# Patient Record
Sex: MALE | Race: WHITE | NOT HISPANIC OR LATINO
[De-identification: names, ages, dates, MRNs, and addresses within clinical notes are randomized per-mention and may not be internally consistent; named-entity substitution may affect disease eponyms.]

---

## 2017-03-23 PROBLEM — Z00.00 ENCOUNTER FOR PREVENTIVE HEALTH EXAMINATION: Noted: 2017-03-23

## 2017-03-24 ENCOUNTER — APPOINTMENT (OUTPATIENT)
Dept: ORTHOPEDIC SURGERY | Facility: CLINIC | Age: 37
End: 2017-03-24

## 2017-03-24 VITALS
HEART RATE: 54 BPM | WEIGHT: 158 LBS | DIASTOLIC BLOOD PRESSURE: 74 MMHG | BODY MASS INDEX: 23.4 KG/M2 | HEIGHT: 69 IN | SYSTOLIC BLOOD PRESSURE: 112 MMHG

## 2017-03-24 DIAGNOSIS — Z87.891 PERSONAL HISTORY OF NICOTINE DEPENDENCE: ICD-10-CM

## 2017-03-24 DIAGNOSIS — Z78.9 OTHER SPECIFIED HEALTH STATUS: ICD-10-CM

## 2017-03-24 DIAGNOSIS — S53.401A UNSPECIFIED SPRAIN OF RIGHT ELBOW, INITIAL ENCOUNTER: ICD-10-CM

## 2017-03-30 PROBLEM — Z00.00 ENCOUNTER FOR PREVENTIVE HEALTH EXAMINATION: Status: ACTIVE | Noted: 2017-03-30

## 2017-03-31 ENCOUNTER — FORM ENCOUNTER (OUTPATIENT)
Age: 37
End: 2017-03-31

## 2017-04-01 ENCOUNTER — OUTPATIENT (OUTPATIENT)
Dept: OUTPATIENT SERVICES | Facility: HOSPITAL | Age: 37
LOS: 1 days | End: 2017-04-01
Payer: COMMERCIAL

## 2017-04-01 ENCOUNTER — APPOINTMENT (OUTPATIENT)
Dept: MRI IMAGING | Facility: CLINIC | Age: 37
End: 2017-04-01

## 2017-04-01 DIAGNOSIS — S53.401A UNSPECIFIED SPRAIN OF RIGHT ELBOW, INITIAL ENCOUNTER: ICD-10-CM

## 2017-04-01 PROCEDURE — 73221 MRI JOINT UPR EXTREM W/O DYE: CPT

## 2017-04-03 ENCOUNTER — APPOINTMENT (OUTPATIENT)
Dept: MRI IMAGING | Facility: CLINIC | Age: 37
End: 2017-04-03

## 2017-04-05 ENCOUNTER — LABORATORY RESULT (OUTPATIENT)
Age: 37
End: 2017-04-05

## 2017-04-11 ENCOUNTER — APPOINTMENT (OUTPATIENT)
Dept: ORTHOPEDIC SURGERY | Facility: AMBULATORY SURGERY CENTER | Age: 37
End: 2017-04-11
Payer: COMMERCIAL

## 2017-04-11 PROCEDURE — 73080 X-RAY EXAM OF ELBOW: CPT | Mod: 26

## 2017-04-11 PROCEDURE — 76000 FLUOROSCOPY <1 HR PHYS/QHP: CPT | Mod: 26

## 2017-04-11 PROCEDURE — 24342 REPAIR OF RUPTURED TENDON: CPT | Mod: RT

## 2017-04-14 ENCOUNTER — APPOINTMENT (OUTPATIENT)
Dept: ORTHOPEDIC SURGERY | Facility: CLINIC | Age: 37
End: 2017-04-14

## 2017-04-17 ENCOUNTER — APPOINTMENT (OUTPATIENT)
Dept: ORTHOPEDIC SURGERY | Facility: CLINIC | Age: 37
End: 2017-04-17

## 2017-05-02 ENCOUNTER — APPOINTMENT (OUTPATIENT)
Dept: ORTHOPEDIC SURGERY | Facility: CLINIC | Age: 37
End: 2017-05-02

## 2017-05-02 VITALS
HEIGHT: 69 IN | HEART RATE: 68 BPM | BODY MASS INDEX: 23.4 KG/M2 | SYSTOLIC BLOOD PRESSURE: 113 MMHG | WEIGHT: 158 LBS | DIASTOLIC BLOOD PRESSURE: 66 MMHG

## 2017-05-26 ENCOUNTER — APPOINTMENT (OUTPATIENT)
Dept: ORTHOPEDIC SURGERY | Facility: CLINIC | Age: 37
End: 2017-05-26

## 2017-05-26 VITALS
HEART RATE: 55 BPM | HEIGHT: 69 IN | SYSTOLIC BLOOD PRESSURE: 113 MMHG | DIASTOLIC BLOOD PRESSURE: 65 MMHG | BODY MASS INDEX: 23.4 KG/M2 | WEIGHT: 158 LBS

## 2017-07-10 ENCOUNTER — APPOINTMENT (OUTPATIENT)
Dept: ORTHOPEDIC SURGERY | Facility: CLINIC | Age: 37
End: 2017-07-10

## 2017-07-10 VITALS
SYSTOLIC BLOOD PRESSURE: 125 MMHG | HEIGHT: 69 IN | HEART RATE: 53 BPM | BODY MASS INDEX: 23.4 KG/M2 | DIASTOLIC BLOOD PRESSURE: 67 MMHG | WEIGHT: 158 LBS

## 2017-07-13 ENCOUNTER — MESSAGE (OUTPATIENT)
Age: 37
End: 2017-07-13

## 2017-09-08 ENCOUNTER — APPOINTMENT (OUTPATIENT)
Dept: ORTHOPEDIC SURGERY | Facility: CLINIC | Age: 37
End: 2017-09-08
Payer: COMMERCIAL

## 2017-09-29 ENCOUNTER — APPOINTMENT (OUTPATIENT)
Dept: ORTHOPEDIC SURGERY | Facility: CLINIC | Age: 37
End: 2017-09-29
Payer: COMMERCIAL

## 2017-09-29 VITALS
HEART RATE: 55 BPM | DIASTOLIC BLOOD PRESSURE: 79 MMHG | WEIGHT: 158 LBS | BODY MASS INDEX: 23.4 KG/M2 | SYSTOLIC BLOOD PRESSURE: 119 MMHG | HEIGHT: 69 IN

## 2017-09-29 DIAGNOSIS — S46.211D STRAIN OF MUSCLE, FASCIA AND TENDON OF OTHER PARTS OF BICEPS, RIGHT ARM, SUBSEQUENT ENCOUNTER: ICD-10-CM

## 2017-09-29 PROCEDURE — 99213 OFFICE O/P EST LOW 20 MIN: CPT

## 2017-09-29 RX ORDER — CYCLOBENZAPRINE HYDROCHLORIDE 5 MG/1
5 TABLET, FILM COATED ORAL
Qty: 30 | Refills: 0 | Status: DISCONTINUED | COMMUNITY
Start: 2017-04-11 | End: 2017-09-29

## 2017-09-29 RX ORDER — OXYCODONE AND ACETAMINOPHEN 5; 325 MG/1; MG/1
5-325 TABLET ORAL
Qty: 30 | Refills: 0 | Status: DISCONTINUED | COMMUNITY
Start: 2017-04-11 | End: 2017-09-29

## 2017-09-29 RX ORDER — CEPHALEXIN 500 MG/1
500 TABLET ORAL 3 TIMES DAILY
Qty: 15 | Refills: 0 | Status: DISCONTINUED | COMMUNITY
Start: 2017-04-11 | End: 2017-09-29

## 2019-09-06 ENCOUNTER — APPOINTMENT (OUTPATIENT)
Dept: ORTHOPEDIC SURGERY | Facility: CLINIC | Age: 39
End: 2019-09-06
Payer: OTHER MISCELLANEOUS

## 2019-09-06 VITALS
DIASTOLIC BLOOD PRESSURE: 69 MMHG | HEIGHT: 69 IN | WEIGHT: 163 LBS | SYSTOLIC BLOOD PRESSURE: 121 MMHG | BODY MASS INDEX: 24.14 KG/M2 | HEART RATE: 73 BPM

## 2019-09-06 DIAGNOSIS — S56.911A STRAIN OF UNSPECIFIED MUSCLES, FASCIA AND TENDONS AT FOREARM LEVEL, RIGHT ARM, INITIAL ENCOUNTER: ICD-10-CM

## 2019-09-06 PROCEDURE — 99203 OFFICE O/P NEW LOW 30 MIN: CPT

## 2019-09-06 PROCEDURE — 73090 X-RAY EXAM OF FOREARM: CPT

## 2019-09-20 ENCOUNTER — APPOINTMENT (OUTPATIENT)
Dept: ORTHOPEDIC SURGERY | Facility: CLINIC | Age: 39
End: 2019-09-20
Payer: OTHER MISCELLANEOUS

## 2019-09-20 DIAGNOSIS — S56.911D STRAIN OF UNSPECIFIED MUSCLES, FASCIA AND TENDONS AT FOREARM LEVEL, RIGHT ARM, SUBSEQUENT ENCOUNTER: ICD-10-CM

## 2019-09-20 PROCEDURE — 99213 OFFICE O/P EST LOW 20 MIN: CPT

## 2022-09-14 ENCOUNTER — APPOINTMENT (OUTPATIENT)
Dept: INTERNAL MEDICINE | Facility: CLINIC | Age: 42
End: 2022-09-14

## 2022-10-24 ENCOUNTER — TRANSCRIPTION ENCOUNTER (OUTPATIENT)
Age: 42
End: 2022-10-24

## 2022-10-24 NOTE — ASU PATIENT PROFILE, ADULT - FALL HARM RISK - UNIVERSAL INTERVENTIONS
Bed in lowest position, wheels locked, appropriate side rails in place/Call bell, personal items and telephone in reach/Instruct patient to call for assistance before getting out of bed or chair/Non-slip footwear when patient is out of bed/[place] to call system/Physically safe environment - no spills, clutter or unnecessary equipment/Purposeful Proactive Rounding/Room/bathroom lighting operational, light cord in reach

## 2022-10-25 ENCOUNTER — TRANSCRIPTION ENCOUNTER (OUTPATIENT)
Age: 42
End: 2022-10-25

## 2022-10-25 ENCOUNTER — OUTPATIENT (OUTPATIENT)
Dept: OUTPATIENT SERVICES | Facility: HOSPITAL | Age: 42
LOS: 1 days | Discharge: HOME | End: 2022-10-25

## 2022-10-25 ENCOUNTER — RESULT REVIEW (OUTPATIENT)
Age: 42
End: 2022-10-25

## 2022-10-25 VITALS
SYSTOLIC BLOOD PRESSURE: 115 MMHG | TEMPERATURE: 99 F | HEART RATE: 53 BPM | RESPIRATION RATE: 16 BRPM | WEIGHT: 163.14 LBS | OXYGEN SATURATION: 100 % | DIASTOLIC BLOOD PRESSURE: 69 MMHG | HEIGHT: 69 IN

## 2022-10-25 VITALS
SYSTOLIC BLOOD PRESSURE: 119 MMHG | TEMPERATURE: 98 F | HEART RATE: 63 BPM | DIASTOLIC BLOOD PRESSURE: 65 MMHG | OXYGEN SATURATION: 100 % | RESPIRATION RATE: 16 BRPM

## 2022-10-25 DIAGNOSIS — S46.219A STRAIN OF MUSCLE, FASCIA AND TENDON OF OTHER PARTS OF BICEPS, UNSPECIFIED ARM, INITIAL ENCOUNTER: Chronic | ICD-10-CM

## 2022-10-25 LAB — SARS-COV-2 RNA SPEC QL NAA+PROBE: NEGATIVE — SIGNIFICANT CHANGE UP

## 2022-10-25 PROCEDURE — 88305 TISSUE EXAM BY PATHOLOGIST: CPT | Mod: 26

## 2022-10-25 PROCEDURE — 88300 SURGICAL PATH GROSS: CPT | Mod: 26,59

## 2022-10-25 DEVICE — KWIRE 0.9X100 TR/TR: Type: IMPLANTABLE DEVICE | Site: LEFT | Status: FUNCTIONAL

## 2022-10-25 DEVICE — IMPLANTABLE DEVICE: Type: IMPLANTABLE DEVICE | Site: LEFT | Status: FUNCTIONAL

## 2022-10-25 RX ORDER — OXYCODONE AND ACETAMINOPHEN 5; 325 MG/1; MG/1
2 TABLET ORAL EVERY 6 HOURS
Refills: 0 | Status: DISCONTINUED | OUTPATIENT
Start: 2022-10-25 | End: 2022-10-25

## 2022-10-25 RX ORDER — ONDANSETRON 8 MG/1
4 TABLET, FILM COATED ORAL ONCE
Refills: 0 | Status: DISCONTINUED | OUTPATIENT
Start: 2022-10-25 | End: 2022-10-25

## 2022-10-25 RX ORDER — SODIUM CHLORIDE 9 MG/ML
1000 INJECTION, SOLUTION INTRAVENOUS
Refills: 0 | Status: DISCONTINUED | OUTPATIENT
Start: 2022-10-25 | End: 2022-10-25

## 2022-10-25 RX ORDER — OXYCODONE AND ACETAMINOPHEN 5; 325 MG/1; MG/1
1 TABLET ORAL
Qty: 21 | Refills: 0
Start: 2022-10-25

## 2022-10-25 RX ORDER — ACETAMINOPHEN 500 MG
650 TABLET ORAL ONCE
Refills: 0 | Status: DISCONTINUED | OUTPATIENT
Start: 2022-10-25 | End: 2022-10-25

## 2022-10-25 RX ORDER — OXYCODONE AND ACETAMINOPHEN 5; 325 MG/1; MG/1
1 TABLET ORAL EVERY 4 HOURS
Refills: 0 | Status: DISCONTINUED | OUTPATIENT
Start: 2022-10-25 | End: 2022-10-25

## 2022-10-25 NOTE — ASU DISCHARGE PLAN (ADULT/PEDIATRIC) - NS MD DC FALL RISK RISK
For information on Fall & Injury Prevention, visit: [email or web address] OR  [email or web address] OR  https://www.cdc.gov/steadi/patient.html

## 2022-10-25 NOTE — BRIEF OPERATIVE NOTE - NSICDXBRIEFPREOP_GEN_ALL_CORE_FT
PRE-OP DIAGNOSIS:  Other hammer toe(s) (acquired), left foot 25-Oct-2022 16:28:46 left 3rd and 4th digits WalknerCamila, left foot 25-Oct-2022 16:29:14  Camila Nye

## 2022-10-25 NOTE — ASU DISCHARGE PLAN (ADULT/PEDIATRIC) - CARE PROVIDER_API CALL
Jhoana Ca (SANDRA)  Surgery Orthopaedic Surgery  [address] [place] [address], [address]  [place] [address]  Phone: [phone]  Fax: [phone]  Established Patient  Follow Up Time: 1 week

## 2022-10-25 NOTE — BRIEF OPERATIVE NOTE - NSICDXBRIEFPOSTOP_GEN_ALL_CORE_FT
POST-OP DIAGNOSIS:  Bunion, left foot 25-Oct-2022 16:29:18  Walkner, Camila  Other hammer toe(s) (acquired), left foot 25-Oct-2022 16:28:56 left 3rd and 4th digits Walkner, Camila

## 2022-10-25 NOTE — ASU DISCHARGE PLAN (ADULT/PEDIATRIC) - ASU DC SPECIAL INSTRUCTIONSFT
Please keep dressing clean, dry and intact until your post-operative visit with Dr. Ca . Please WBAT in surgical shoe with cane to LLE. Please keep dressing clean, dry and intact until your post-operative visit with Dr. Ca. Please heel touch WBAT in surgical shoe with cane to LLE.

## 2022-10-25 NOTE — BRIEF OPERATIVE NOTE - NSICDXBRIEFPROCEDURE_GEN_ALL_CORE_FT
PROCEDURES:  Mio-Federico bunionectomy of left foot 25-Oct-2022 16:27:30  Camila Nye  Tenotomy, flexor, toe, open 25-Oct-2022 16:27:47 3rd and 4th digits left foot Camila Nye

## 2022-11-02 LAB — SURGICAL PATHOLOGY STUDY: SIGNIFICANT CHANGE UP

## 2023-07-26 ENCOUNTER — INPATIENT (INPATIENT)
Facility: HOSPITAL | Age: 43
LOS: 0 days | Discharge: HOME | End: 2023-07-27
Attending: STUDENT IN AN ORGANIZED HEALTH CARE EDUCATION/TRAINING PROGRAM | Admitting: STUDENT IN AN ORGANIZED HEALTH CARE EDUCATION/TRAINING PROGRAM
Payer: COMMERCIAL

## 2023-07-26 VITALS
TEMPERATURE: 98 F | SYSTOLIC BLOOD PRESSURE: 122 MMHG | OXYGEN SATURATION: 100 % | HEART RATE: 83 BPM | RESPIRATION RATE: 16 BRPM | DIASTOLIC BLOOD PRESSURE: 77 MMHG

## 2023-07-26 DIAGNOSIS — S46.219A STRAIN OF MUSCLE, FASCIA AND TENDON OF OTHER PARTS OF BICEPS, UNSPECIFIED ARM, INITIAL ENCOUNTER: Chronic | ICD-10-CM

## 2023-07-26 DIAGNOSIS — L08.9 LOCAL INFECTION OF THE SKIN AND SUBCUTANEOUS TISSUE, UNSPECIFIED: ICD-10-CM

## 2023-07-26 DIAGNOSIS — Z98.890 OTHER SPECIFIED POSTPROCEDURAL STATES: Chronic | ICD-10-CM

## 2023-07-26 LAB
ALBUMIN SERPL ELPH-MCNC: 4.6 G/DL — SIGNIFICANT CHANGE UP (ref 3.3–5)
ALP SERPL-CCNC: 58 U/L — SIGNIFICANT CHANGE UP (ref 40–120)
ALT FLD-CCNC: 69 U/L — HIGH (ref 4–41)
ANION GAP SERPL CALC-SCNC: 19 MMOL/L — HIGH (ref 7–14)
AST SERPL-CCNC: 100 U/L — HIGH (ref 4–40)
BASOPHILS # BLD AUTO: 0.05 K/UL — SIGNIFICANT CHANGE UP (ref 0–0.2)
BASOPHILS NFR BLD AUTO: 0.8 % — SIGNIFICANT CHANGE UP (ref 0–2)
BILIRUB SERPL-MCNC: 0.5 MG/DL — SIGNIFICANT CHANGE UP (ref 0.2–1.2)
BUN SERPL-MCNC: 16 MG/DL — SIGNIFICANT CHANGE UP (ref 7–23)
CALCIUM SERPL-MCNC: 9.2 MG/DL — SIGNIFICANT CHANGE UP (ref 8.4–10.5)
CHLORIDE SERPL-SCNC: 100 MMOL/L — SIGNIFICANT CHANGE UP (ref 98–107)
CO2 SERPL-SCNC: 22 MMOL/L — SIGNIFICANT CHANGE UP (ref 22–31)
CREAT SERPL-MCNC: 0.64 MG/DL — SIGNIFICANT CHANGE UP (ref 0.5–1.3)
CRP SERPL-MCNC: 4.6 MG/L — SIGNIFICANT CHANGE UP
EGFR: 120 ML/MIN/1.73M2 — SIGNIFICANT CHANGE UP
EOSINOPHIL # BLD AUTO: 0.04 K/UL — SIGNIFICANT CHANGE UP (ref 0–0.5)
EOSINOPHIL NFR BLD AUTO: 0.6 % — SIGNIFICANT CHANGE UP (ref 0–6)
ERYTHROCYTE [SEDIMENTATION RATE] IN BLOOD: 8 MM/HR — SIGNIFICANT CHANGE UP (ref 1–15)
GLUCOSE SERPL-MCNC: 97 MG/DL — SIGNIFICANT CHANGE UP (ref 70–99)
HCT VFR BLD CALC: 35.8 % — LOW (ref 39–50)
HGB BLD-MCNC: 12.2 G/DL — LOW (ref 13–17)
IANC: 2.67 K/UL — SIGNIFICANT CHANGE UP (ref 1.8–7.4)
IMM GRANULOCYTES NFR BLD AUTO: 0.3 % — SIGNIFICANT CHANGE UP (ref 0–0.9)
LYMPHOCYTES # BLD AUTO: 2.83 K/UL — SIGNIFICANT CHANGE UP (ref 1–3.3)
LYMPHOCYTES # BLD AUTO: 44.8 % — HIGH (ref 13–44)
MAGNESIUM SERPL-MCNC: 1.8 MG/DL — SIGNIFICANT CHANGE UP (ref 1.6–2.6)
MCHC RBC-ENTMCNC: 32.8 PG — SIGNIFICANT CHANGE UP (ref 27–34)
MCHC RBC-ENTMCNC: 34.1 GM/DL — SIGNIFICANT CHANGE UP (ref 32–36)
MCV RBC AUTO: 96.2 FL — SIGNIFICANT CHANGE UP (ref 80–100)
MONOCYTES # BLD AUTO: 0.7 K/UL — SIGNIFICANT CHANGE UP (ref 0–0.9)
MONOCYTES NFR BLD AUTO: 11.1 % — SIGNIFICANT CHANGE UP (ref 2–14)
NEUTROPHILS # BLD AUTO: 2.67 K/UL — SIGNIFICANT CHANGE UP (ref 1.8–7.4)
NEUTROPHILS NFR BLD AUTO: 42.4 % — LOW (ref 43–77)
NRBC # BLD: 0 /100 WBCS — SIGNIFICANT CHANGE UP (ref 0–0)
NRBC # FLD: 0 K/UL — SIGNIFICANT CHANGE UP (ref 0–0)
PHOSPHATE SERPL-MCNC: 3.8 MG/DL — SIGNIFICANT CHANGE UP (ref 2.5–4.5)
PLATELET # BLD AUTO: 174 K/UL — SIGNIFICANT CHANGE UP (ref 150–400)
POTASSIUM SERPL-MCNC: 3.7 MMOL/L — SIGNIFICANT CHANGE UP (ref 3.5–5.3)
POTASSIUM SERPL-SCNC: 3.7 MMOL/L — SIGNIFICANT CHANGE UP (ref 3.5–5.3)
PROT SERPL-MCNC: 7.8 G/DL — SIGNIFICANT CHANGE UP (ref 6–8.3)
RBC # BLD: 3.72 M/UL — LOW (ref 4.2–5.8)
RBC # FLD: 13.2 % — SIGNIFICANT CHANGE UP (ref 10.3–14.5)
SODIUM SERPL-SCNC: 141 MMOL/L — SIGNIFICANT CHANGE UP (ref 135–145)
WBC # BLD: 6.31 K/UL — SIGNIFICANT CHANGE UP (ref 3.8–10.5)
WBC # FLD AUTO: 6.31 K/UL — SIGNIFICANT CHANGE UP (ref 3.8–10.5)

## 2023-07-26 PROCEDURE — 99285 EMERGENCY DEPT VISIT HI MDM: CPT

## 2023-07-26 PROCEDURE — 73630 X-RAY EXAM OF FOOT: CPT | Mod: 26,RT

## 2023-07-26 PROCEDURE — 93971 EXTREMITY STUDY: CPT | Mod: 26,LT

## 2023-07-26 PROCEDURE — 99223 1ST HOSP IP/OBS HIGH 75: CPT

## 2023-07-26 RX ORDER — FOLIC ACID 0.8 MG
1 TABLET ORAL DAILY
Refills: 0 | Status: DISCONTINUED | OUTPATIENT
Start: 2023-07-26 | End: 2023-07-27

## 2023-07-26 RX ORDER — ACETAMINOPHEN 500 MG
650 TABLET ORAL EVERY 6 HOURS
Refills: 0 | Status: DISCONTINUED | OUTPATIENT
Start: 2023-07-26 | End: 2023-07-27

## 2023-07-26 RX ORDER — APIXABAN 2.5 MG/1
10 TABLET, FILM COATED ORAL ONCE
Refills: 0 | Status: COMPLETED | OUTPATIENT
Start: 2023-07-26 | End: 2023-07-26

## 2023-07-26 RX ORDER — SODIUM CHLORIDE 9 MG/ML
1000 INJECTION, SOLUTION INTRAVENOUS ONCE
Refills: 0 | Status: COMPLETED | OUTPATIENT
Start: 2023-07-26 | End: 2023-07-26

## 2023-07-26 RX ORDER — SODIUM CHLORIDE 9 MG/ML
1000 INJECTION, SOLUTION INTRAVENOUS
Refills: 0 | Status: DISCONTINUED | OUTPATIENT
Start: 2023-07-26 | End: 2023-07-27

## 2023-07-26 RX ORDER — AMPICILLIN SODIUM AND SULBACTAM SODIUM 250; 125 MG/ML; MG/ML
3 INJECTION, POWDER, FOR SUSPENSION INTRAMUSCULAR; INTRAVENOUS ONCE
Refills: 0 | Status: COMPLETED | OUTPATIENT
Start: 2023-07-26 | End: 2023-07-26

## 2023-07-26 RX ORDER — AMPICILLIN SODIUM AND SULBACTAM SODIUM 250; 125 MG/ML; MG/ML
3 INJECTION, POWDER, FOR SUSPENSION INTRAMUSCULAR; INTRAVENOUS EVERY 6 HOURS
Refills: 0 | Status: DISCONTINUED | OUTPATIENT
Start: 2023-07-27 | End: 2023-07-27

## 2023-07-26 RX ORDER — THIAMINE MONONITRATE (VIT B1) 100 MG
100 TABLET ORAL DAILY
Refills: 0 | Status: DISCONTINUED | OUTPATIENT
Start: 2023-07-26 | End: 2023-07-27

## 2023-07-26 RX ADMIN — AMPICILLIN SODIUM AND SULBACTAM SODIUM 200 GRAM(S): 250; 125 INJECTION, POWDER, FOR SUSPENSION INTRAMUSCULAR; INTRAVENOUS at 21:04

## 2023-07-26 RX ADMIN — Medication 1 MILLIGRAM(S): at 23:51

## 2023-07-26 RX ADMIN — Medication 1 TABLET(S): at 23:50

## 2023-07-26 RX ADMIN — Medication 100 MILLIGRAM(S): at 23:50

## 2023-07-26 RX ADMIN — SODIUM CHLORIDE 1000 MILLILITER(S): 9 INJECTION, SOLUTION INTRAVENOUS at 23:49

## 2023-07-26 RX ADMIN — APIXABAN 10 MILLIGRAM(S): 2.5 TABLET, FILM COATED ORAL at 21:02

## 2023-07-26 RX ADMIN — Medication 50 MILLIGRAM(S): at 21:02

## 2023-07-26 NOTE — ED PROVIDER NOTE - CLINICAL SUMMARY MEDICAL DECISION MAKING FREE TEXT BOX
Wilner Sapp, DO: 42 YOM, no reported PMH, PW right foot swelling.  Patient had bunion surgery on 6/25, after postop check today noted to be swollen erythematous, advised by podiatry to come for evaluation.  Patient denies systemic signs of illness. Patient arrives HDS, well-appearing, neurovascular intact.  Do not suspect necrotizing fasciitis.  Will evaluate for collection versus OM versus cellulitis.  Labs, imaging, reassess.  Podiatry consulted.

## 2023-07-26 NOTE — ED PROVIDER NOTE - CARE PLAN
Principal Discharge DX:	Soft tissue infection   . 1 Principal Discharge DX:	Soft tissue infection  Secondary Diagnosis:	DVT, lower extremity  Secondary Diagnosis:	Alcohol dependence with withdrawal

## 2023-07-26 NOTE — H&P ADULT - NSHPSOCIALHISTORY_GEN_ALL_CORE
SOCIAL HISTORY:    Marital Status:  (  )    (  ) Single        (  )        (  )        (  )   Lives with:          (  ) Alone      (  ) Spouse     (  ) Children         (  ) Parents             (  ) Other    No history of smoking  No history of alcohol abuse  No history of illegal drug use    Occupation: SOCIAL HISTORY:    Marital Status:  (  )    (x  ) Single        (  )        (  )        (  )   Lives with:          ( x ) Alone      (  ) Spouse     (  ) Children         (  ) Parents             (  ) Other    History of smoking; quit in the 1990's after smoking 0.5 to 1ppd x ~ 5 years  Recent history of alcohol overuse - since the right bunionectomy in 06/2023.  Last drink was yesterday  No history of illegal drug use    Occupation:

## 2023-07-26 NOTE — ED ADULT TRIAGE NOTE - CHIEF COMPLAINT QUOTE
Pt sent into ED by doctor for R foot infection s/p bunion removal on 6/25. R foot red and swollen. Denies any fevers, chills, nausea or vomiting. Denies any PHx.

## 2023-07-26 NOTE — H&P ADULT - PROBLEM SELECTOR PLAN 4
- Questionable displacement of the proximal aspect of the base and shaft of the first metatarsal relatie to the head and neck, with mild productive bony changes..., per Podiatry team  - f/u with Podiatry team for further recommendations - reported drinking excessively since R-bunionectomy in 06/2023  - last drink prior to coming to the ED  - reports never having tremors/withdrawal symptoms/signs  - received librium 50 mg PO x one in the ED  - on symptom triggered CIWA  - prescribed thiamine, folic acid, MVI daily  - short course of daily PPI (14 doses)  - IVF hydration of one liter LR, followed by LR at 100 mL/Hr x 10 hours  - f/u AM lab-work  - SBIRT ordered  - Social Work consult in the AM

## 2023-07-26 NOTE — ED PROVIDER NOTE - PHYSICAL EXAMINATION
General: well appearing, interactive, well nourished, no apparent distress, ncat  HEENT: EOMI, PERRLA, normal mucosa, normal oropharynx, no lesions on the lips or on oral mucosa, normal external ear  Neck: supple, no lymphadenopathy, full range of motion, no nuchal rigidity  CV: RRR, normal S1 and S2 with no murmur, capillary refill less than two seconds, dp 2+ b/l     MSK: rle Swollen compared to left, extending to knee.  Noted were swelling at foot, as well as mild erythema at site of surgery.  No crepitus, fluctuance.  Mild erythema noted on foot.  Neuro: CN II-XII grossly intact, muscle strength 5/5 in all extremities, normal gait  Skin: no rashes, skin intact

## 2023-07-26 NOTE — ED ADULT NURSE REASSESSMENT NOTE - NS ED NURSE REASSESS COMMENT FT1
Pt in  zone 11 AxOx4 accompanied by wife at bedside presenting for suspicion of infection s/p operation of R Foot bunion surgery. Was at post op appointment today and surgeon sent him to ED. Pt admitted for infection control Pt in  zone 11 AxOx4 accompanied by wife at bedside presenting for suspicion of infection s/p operation of R Foot bunion surgery. Was at post op appointment today and surgeon sent him to ED. Pt admitted for infection control. DVT found on US. Pt admits to daily drinking, has never been hospitalized for withdrawal. CIWA documented as per flowsheet. Medicated as ordered. L20G patent. Pending transport for bed placement.

## 2023-07-26 NOTE — CONSULT NOTE ADULT - SUBJECTIVE AND OBJECTIVE BOX
Patient is a 42y old  Male who presents with a chief complaint of     HPI:      PAST MEDICAL & SURGICAL HISTORY:  No pertinent past medical history      Biceps tendon tear  right          MEDICATIONS  (STANDING):    MEDICATIONS  (PRN):      Allergies    No Known Allergies    Intolerances        VITALS:    Vital Signs Last 24 Hrs  T(C): 36.6 (26 Jul 2023 15:31), Max: 36.6 (26 Jul 2023 15:31)  T(F): 97.8 (26 Jul 2023 15:31), Max: 97.8 (26 Jul 2023 15:31)  HR: 83 (26 Jul 2023 15:31) (83 - 83)  BP: 122/77 (26 Jul 2023 15:31) (122/77 - 122/77)  BP(mean): --  RR: 16 (26 Jul 2023 15:31) (16 - 16)  SpO2: 100% (26 Jul 2023 15:31) (100% - 100%)    Parameters below as of 26 Jul 2023 15:31  Patient On (Oxygen Delivery Method): room air        LABS:                          12.2   6.31  )-----------( 174      ( 26 Jul 2023 17:30 )             35.8       07-26    141  |  100  |  16  ----------------------------<  97  3.7   |  22  |  0.64    Ca    9.2      26 Jul 2023 17:30    TPro  7.8  /  Alb  4.6  /  TBili  0.5  /  DBili  x   /  AST  100<H>  /  ALT  69<H>  /  AlkPhos  58  07-26      CAPILLARY BLOOD GLUCOSE              LOWER EXTREMITY PHYSICAL EXAM:    Vascular: DP/PT 2/4, B/L, CFT <3 seconds B/L, Temperature gradient warm to cool, B/L.   Neuro: Epicritic sensation intact to the level of digits, B/L.  Musculoskeletal/Ortho: unremarkable  Skin: R lower extremity diffuse pitting +1 edema extending from the toes to the level of mid-calf, Right foot s/p 6/25 bunion surgery, sign of dehiscence and wound to subQ along proximal end of the existing surgical incision over the dorsal aspect of the first MPJ, periwound erythema no probing, no drainage, no purulence, no malodor, no crepitation, medial wound to dermis along the medial distal 1st metatarsal, periwound erythema, no probing, no drainage. L foot no open wounds, no signs of acute infection.         RADIOLOGY & ADDITIONAL STUDIES:  < from: Xray Foot AP + Lateral + Oblique, Right (07.26.23 @ 17:40) >  ******PRELIMINARY REPORT******      ******PRELIMINARY REPORT******         ACC: [id number] EXAM:  XR FOOT COMP MIN 3 VIEWS RT   ORDERED BY: VALENCIA GOLDBERG     PROCEDURE DATE:  07/26/2023    ******PRELIMINARY REPORT******      ******PRELIMINARY REPORT******           INTERPRETATION:  Questionable displacement of the proximal aspect of the   base and shaft of the first metatarsal relatie to the head and neck, with   mild productive bony changes. Correlate for possible malunion fracture.        ******PRELIMINARY REPORT******      ******PRELIMINARY REPORT******       ALEX PARR MD; Resident Radiologist  This document is a PRELIMINARY interpretation and is pending final   attending approval. Jul 26 2023  6:18PM    < end of copied text >  < from: US Duplex Venous Lower Ext Ltd, Right (07.26.23 @ 18:50) >    ACC: [id number] EXAM:  US DPLX LWR EXT VEINS LTD RT   ORDERED BY: VALENCIA GOLDBERG     PROCEDURE DATE:  07/26/2023          INTERPRETATION:  CLINICAL INFORMATION: Right lower extremity swelling.   Status post bunion removal 1 month ago.    COMPARISON: None available.    TECHNIQUE: Duplex sonography of the RIGHT LOWER extremity veins with   color and spectral Doppler, with and without compression.    FINDINGS:    There is normal compressibility of the right common femoral, femoral and   popliteal veins.  The contralateral common femoral vein is patent.  Doppler examination shows normal spontaneous and phasic flow.    The peroneal and soleal veins are incompressible and demonstrate   intraluminal echogenic material.    IMPRESSION:  Acute deep venous thrombosis: below the knee.    Findings discussed with DR. VALENCIA GOLDBERG on 7/26/2023 7:03 PM with read back.        --- End of Report ---          BIANCA NOVA MD; Resident Radiologist  This document has been electronically signed.  LUCÍA JEWELL MD; Attending Radiologist  This document has been electronically signed. Jul 26 2023  7:05PM    < end of copied text >

## 2023-07-26 NOTE — ED PROVIDER NOTE - PROGRESS NOTE DETAILS
Wilner Sapp, DO: Patient reassessed, NAD, non-toxic appearing. results dw pt/family, questions answered. pt feels mildly flushed and diaphoretic, drinks 1 pt daily. case dw Dr. Ca on the phone who agrees with admission. pt agrees. endorsed to hospitalist. no hx seizure.

## 2023-07-26 NOTE — H&P ADULT - PROBLEM SELECTOR PLAN 1
- presented for eval of post surgical right bunionectomy  - US = "The peroneal and soleal veins are incompressible and demonstrate intraluminal echogenic material.  ...Acute deep venous thrombosis: below the knee."  - reportedly more sedentary, due to right bunionectomy  - started on Eliquis 10 mg in the ED; continuing x 3 doses, then switch to maintenance dose, as per Eliquis protocol  - analgesic PRN - presented for eval of post surgical right bunionectomy  - US = "The peroneal and soleal veins are incompressible and demonstrate intraluminal echogenic material.  ...Acute deep venous thrombosis: below the knee."  - reportedly more sedentary, due to right bunionectomy  - started on Eliquis 10 mg in the ED; continuing x 3 doses, then switch to maintenance dose, as per Eliquis protocol  - analgesic PRN  - Vascular consult in the AM (per Podiatry Team)

## 2023-07-26 NOTE — H&P ADULT - PROBLEM SELECTOR PLAN 6
- Hgb = 12.2, MCV = 96.2  - in the context of excessive alcohol consumption  - no report of observed bleeding and none appreciated  - f/u anemia profile in the AM, as well as Vit B12, folate and TSH levels  - f/u hemoglobin trend - Hgb = 12.2, MCV = 96.2  - in the context of excessive alcohol consumption  - no report of observed bleeding and none appreciated  - f/u anemia profile in the AM, as well as Vit B12, folate and TSH levels  - f/u hemoglobin trend  - GI consult (?as outpatient)

## 2023-07-26 NOTE — H&P ADULT - HISTORY OF PRESENT ILLNESS
42 year old male, with past history significant for Alcohol abuse, and Bunionectomy (06/2023), presented to the ED secondary to right foot swelling.  Seen and evaluated at bedside    Vital signs upon ED presentation as follows: BP = 122/77, HR = 83, RR = 16, T = 36.6 C (97.8 F), O2 Sat = 100% on RA.  Diagnosed with Soft Tissue Infection, DVT - Lower Extremity and Alcohol Dependence with Withdrawal and prescribed apixaban 10 mg, Unasyn 3 grams and librium 50 mg in the ED. 42 year old male, with past history significant for Alcohol abuse, and Bunionectomy (06/2023), presented to the ED secondary to right foot swelling.  Seen and evaluated at bedside; NAD.  Patient reports visiting for post-op check of the right bunionectomy site and being sent to the ED for further evaluation.  Has been experiencing some throbbing pain of the right foot and noted swelling of the right leg.  No fever, chills, diaphoresis.  Wears the specialized boot as prescribed.  Patient reports excessive alcohol use since the surgery, but is trying to stop.    Vital signs upon ED presentation as follows: BP = 122/77, HR = 83, RR = 16, T = 36.6 C (97.8 F), O2 Sat = 100% on RA.  Diagnosed with Soft Tissue Infection, DVT - Lower Extremity and Alcohol Dependence with Withdrawal and prescribed apixaban 10 mg, Unasyn 3 grams and librium 50 mg in the ED.

## 2023-07-26 NOTE — H&P ADULT - PROBLEM SELECTOR PLAN 3
- reported drinking excessively since R-bunionectomy in 06/2023  - last drink prior to coming to the ED  - reports never having tremors/withdrawal symptoms/signs  - received librium 50 mg PO x one in the ED  - on symptom triggered CIWA  - prescribed thiamine, folic acid, MVI daily  - short course of daily PPI (14 doses)  - IVF hydration of one liter LR, followed by LR at 100 mL/Hr x 10 hours  - f/u AM lab-work  - SBIRT ordered  - Social Work consult in the AM - Questionable displacement of the proximal aspect of the base and shaft of the first metatarsal relatie to the head and neck, with mild productive bony changes..., per Podiatry team  - f/u with Podiatry team for further recommendations

## 2023-07-26 NOTE — H&P ADULT - NSHPREVIEWOFSYSTEMS_GEN_ALL_CORE
REVIEW OF SYSTEMS:    CONSTITUTIONAL: No weakness, fever, chills or sweating  EYES/ENT: No visual changes.  No dysphagia  NECK: No pain or stiffness  RESPIRATORY: No cough or hemoptysis.  No shortness of breath  CARDIOVASCULAR: No chest pain or palpitations.  No lower extremity edema  GASTROINTESTINAL: No epigastric or abdominal pain. No nausea, vomiting or hematemesis.  No diarrhea or constipation. No melena or hematochezia.  GENITOURINARY: No dysuria, frequency or hematuria  MUSCULOSKELETAL: Throbbing pain of the right foot and swelling of the right leg over the recent days  NEUROLOGICAL: No numbness or weakness  PSYCHIATRY: No anxiety, or depression.  SKIN: No itching, burning, rashes, or lesions   All other review of systems is negative unless indicated above.

## 2023-07-26 NOTE — H&P ADULT - NSHPPHYSICALEXAM_GEN_ALL_CORE
Vital Signs Last 24 Hrs  T(C): 36.7 (26 Jul 2023 20:01), Max: 36.7 (26 Jul 2023 20:01)  T(F): 98.1 (26 Jul 2023 20:01), Max: 98.1 (26 Jul 2023 20:01)  HR: 73 (26 Jul 2023 20:01) (73 - 83)  BP: 124/80 (26 Jul 2023 20:01) (122/77 - 124/80)  BP(mean): --  RR: 17 (26 Jul 2023 20:01) (16 - 17)  SpO2: 100% (26 Jul 2023 20:01) (100% - 100%)    Parameters below as of 26 Jul 2023 20:01  Patient On (Oxygen Delivery Method): room air    ================================================= Vital Signs Last 24 Hrs  T(C): 36.7 (26 Jul 2023 20:01), Max: 36.7 (26 Jul 2023 20:01)  T(F): 98.1 (26 Jul 2023 20:01), Max: 98.1 (26 Jul 2023 20:01)  HR: 73 (26 Jul 2023 20:01) (73 - 83)  BP: 124/80 (26 Jul 2023 20:01) (122/77 - 124/80)  BP(mean): --  RR: 17 (26 Jul 2023 20:01) (16 - 17)  SpO2: 100% (26 Jul 2023 20:01) (100% - 100%)    Parameters below as of 26 Jul 2023 20:01  Patient On (Oxygen Delivery Method): room air    =================================================  PHYSICAL EXAMINATION:    APPEARANCE: Adequately groomed, adequately nourished male, lying propped up in bed in NAD  HEENT: Mildly dry oral mucosa.  Pupils reactive to light.  EOMI  LYMPHATIC: No lymphadenopathy appreciated  CARDIOVASCULAR: (+) S1 S2.  No JVD.  No murmurs.  No edema  RESPIRATORY: No wheezing, rhonchi, crackles appreciated  GASTROINTESTINAL: Soft.  Non-tender.  (+) BS  GENITOURINARY: No suprapubic tenderness.  No CVA tenderness B/L  EXTREMITIES: Normal range of motion.  No clubbing, cyanosis or edema  MUSCULOSKELETAL: Right foot wrapped in clean dressing.  Mild swelling of the right leg  SKIN: Tanned appearance of skin with some flushed appearing areas.  No rashes. No ecchymoses.  No cyanosis  PSYCHIATRIC: A&O x 3.  Mood & affect appropriate to situation  NEUROLOGICAL: Non-focal, GÓMEZ x 4 against gravity  VASCULAR: Radial pulses palpable

## 2023-07-26 NOTE — ED ADULT NURSE NOTE - NSFALLUNIVINTERV_ED_ALL_ED
Bed/Stretcher in lowest position, wheels locked, appropriate side rails in place/Call bell, personal items and telephone in reach/Instruct patient to call for assistance before getting out of bed/chair/stretcher/Non-slip footwear applied when patient is off stretcher/[place] to call system/Physically safe environment - no spills, clutter or unnecessary equipment/Purposeful proactive rounding/Room/bathroom lighting operational, light cord in reach

## 2023-07-26 NOTE — H&P ADULT - TIME-BASED
ANTICOAGULATION  MANAGEMENT - BPA Interacting Medication Review    Interacting medication(s): Sulfamethoxazole-trimethoprim (Bactrim) with warfarin.    Duration: 7 days, 10/23 to 10/30    New medication?:  Yes, interaction per Micromedex, may increase INR and risk of bleeding.    Plan:    Left a detailed message for Stefan regarding potential interaction.     Warfarin instructions: continue current warfarin dose    Follow up INR recommended in:  Mon 10/28/19     Anticoagulation calendar updated    Torin Tobar RN           80

## 2023-07-26 NOTE — H&P ADULT - PROBLEM SELECTOR PLAN 5
- in the setting of alcohol abuse  - lab-work appears hemoconcentrated and BUN/Cr ratio = 25:1  - prescribed one liter LR, followed by one liter over 10 hours  - encourage oral hydration, as tolerated  - f/u electrolytes, renal function, clinical status for improvement

## 2023-07-26 NOTE — CONSULT NOTE ADULT - ASSESSMENT
A 41 yo s/p 6/25 bunion surgery presented with the right foot pain and swelling  - Pt seen and evaluated.  - Afebrile, WBC 6.31, ESR 8, CRP 0.46  - R lower extremity diffuse pitting +1 edema extending from the toes to the level of mid-calf, Right foot s/p 6/25 bunion surgery, sign of dehiscence and wound to subQ along proximal end of the existing surgical incision over the dorsal aspect of the first MPJ, periwound erythema no probing, no drainage, no purulence, no malodor, no crepitation, medial wound to dermis along the medial distal 1st metatarsal, periwound erythema, no probing, no drainage. L foot no open wounds, no signs of acute infection.     - R foot Xray:  Questionable displacement of the proximal aspect of the base and shaft of the first metatarsal relatie to the head and neck, with mild productive bony changes. Correlate for possible malunion fracture, (prelim read).  - R foot venous duplex: Acute DVT.  - R foot wound culture obtained.  - Recommended Augmentin PO for 10 days  - Recommend vascular consult  - Admit for DVT workup  - Weight bearing as tolerated to the heel of right foot in a surgical shoe  - Pt is stable for discharge from pod standpoint, follow up with his podiatrist upon discharge within 1 week.  - Discussed with attending

## 2023-07-26 NOTE — ED PROVIDER NOTE - OBJECTIVE STATEMENT
42 YOM, no reported PMH, PW right foot swelling.  Patient had bunion surgery on 6/25, after postop check today noted to be swollen erythematous, advised by podiatry to come for evaluation.  Patient denies systemic signs of illness.

## 2023-07-26 NOTE — H&P ADULT - PROBLEM SELECTOR PLAN 2
- no fever, chills, diaphoresis; has throbbing pain of the extremity  - CRP = 4.6, ESR = 8; no leukocytosis  - some dehiscence of the post surgical bunionectomy site on the right, per Podiatry team's note  - X-ray notes "Comminuted fracture of the distal shaft of the first metatarsal.  Surrounding soft tissue edema.  Mild chronic degenerative changes in the tarsal bones..."  - wound culture obtained (please f/u)  - s/p Unasyn 3 grams in the ED; continuing Unasyn  - Tylenol PRN mild pain  - if patient becomes febrile, would get blood culture - no fever, chills, diaphoresis; has throbbing pain of the extremity  - CRP = 4.6, ESR = 8; no leukocytosis  - some dehiscence of the post surgical bunionectomy site on the right, per Podiatry team's note  - X-ray notes "Comminuted fracture of the distal shaft of the first metatarsal.  Surrounding soft tissue edema.  Mild chronic degenerative changes in the tarsal bones..."  - wound culture obtained (please f/u)  - s/p Unasyn 3 grams in the ED; continuing Unasyn  - Tylenol PRN mild pain  - if patient becomes febrile, would get blood culture  - would likely benefit from ID consult

## 2023-07-26 NOTE — H&P ADULT - NSHPLABSRESULTS_GEN_ALL_CORE
12.2   6.31  )-----------( 174 ( 26 Jul 2023 17:30 )             35.8     141  |  100  |  16  ----------------------------<  97     07-26  3.7   |  22  |  0.64    Ca    9.2      26 Jul 2023 17:30    TPro  7.8  /  Alb  4.6  /  TBili  0.5  /  DBili  x   /  AST  100<H>  /  ALT  69<H>  /  AlkPhos  58  07-26    =============================================================================        =============================================================================

## 2023-07-26 NOTE — PATIENT PROFILE ADULT - FALL HARM RISK - RISK INTERVENTIONS

## 2023-07-26 NOTE — H&P ADULT - ASSESSMENT
[ x ]  Lab studies personally reviewed  [ x ]  Radiology personally reviewed  [ x ]  Old records personally reviewed    42 year old male, with past history significant for Alcohol abuse, and Bunionectomy (06/2023), presented to the ED secondary to right foot swelling.  Diagnosed with Soft Tissue Infection, DVT - Lower Extremity and Alcohol Dependence with Withdrawal in the ED.

## 2023-07-26 NOTE — H&P ADULT - NSICDXPASTSURGICALHX_GEN_ALL_CORE_FT
PAST SURGICAL HISTORY:  Biceps tendon tear right    History of bunionectomy      PAST SURGICAL HISTORY:  Biceps tendon tear right    History of bunionectomy of left great toe     History of bunionectomy of right great toe

## 2023-07-27 ENCOUNTER — TRANSCRIPTION ENCOUNTER (OUTPATIENT)
Age: 43
End: 2023-07-27

## 2023-07-27 VITALS
SYSTOLIC BLOOD PRESSURE: 118 MMHG | TEMPERATURE: 98 F | RESPIRATION RATE: 18 BRPM | DIASTOLIC BLOOD PRESSURE: 77 MMHG | OXYGEN SATURATION: 99 % | HEART RATE: 70 BPM

## 2023-07-27 DIAGNOSIS — S62.399B: ICD-10-CM

## 2023-07-27 DIAGNOSIS — I82.401 ACUTE EMBOLISM AND THROMBOSIS OF UNSPECIFIED DEEP VEINS OF RIGHT LOWER EXTREMITY: ICD-10-CM

## 2023-07-27 DIAGNOSIS — Z29.9 ENCOUNTER FOR PROPHYLACTIC MEASURES, UNSPECIFIED: ICD-10-CM

## 2023-07-27 DIAGNOSIS — F10.10 ALCOHOL ABUSE, UNCOMPLICATED: ICD-10-CM

## 2023-07-27 DIAGNOSIS — D64.9 ANEMIA, UNSPECIFIED: ICD-10-CM

## 2023-07-27 DIAGNOSIS — E86.0 DEHYDRATION: ICD-10-CM

## 2023-07-27 DIAGNOSIS — Z98.890 OTHER SPECIFIED POSTPROCEDURAL STATES: Chronic | ICD-10-CM

## 2023-07-27 DIAGNOSIS — L08.9 LOCAL INFECTION OF THE SKIN AND SUBCUTANEOUS TISSUE, UNSPECIFIED: ICD-10-CM

## 2023-07-27 LAB
24R-OH-CALCIDIOL SERPL-MCNC: 64.5 NG/ML — SIGNIFICANT CHANGE UP (ref 30–80)
A1C WITH ESTIMATED AVERAGE GLUCOSE RESULT: 4.4 % — SIGNIFICANT CHANGE UP (ref 4–5.6)
ALBUMIN SERPL ELPH-MCNC: 4.3 G/DL — SIGNIFICANT CHANGE UP (ref 3.3–5)
ALP SERPL-CCNC: 56 U/L — SIGNIFICANT CHANGE UP (ref 40–120)
ALT FLD-CCNC: 63 U/L — HIGH (ref 4–41)
ANION GAP SERPL CALC-SCNC: 17 MMOL/L — HIGH (ref 7–14)
APTT BLD: 35.4 SEC — SIGNIFICANT CHANGE UP (ref 24.5–35.6)
AST SERPL-CCNC: 87 U/L — HIGH (ref 4–40)
BASOPHILS # BLD AUTO: 0.04 K/UL — SIGNIFICANT CHANGE UP (ref 0–0.2)
BASOPHILS NFR BLD AUTO: 0.7 % — SIGNIFICANT CHANGE UP (ref 0–2)
BILIRUB SERPL-MCNC: 1.2 MG/DL — SIGNIFICANT CHANGE UP (ref 0.2–1.2)
BUN SERPL-MCNC: 12 MG/DL — SIGNIFICANT CHANGE UP (ref 7–23)
CALCIUM SERPL-MCNC: 9.5 MG/DL — SIGNIFICANT CHANGE UP (ref 8.4–10.5)
CHLORIDE SERPL-SCNC: 100 MMOL/L — SIGNIFICANT CHANGE UP (ref 98–107)
CO2 SERPL-SCNC: 22 MMOL/L — SIGNIFICANT CHANGE UP (ref 22–31)
CREAT SERPL-MCNC: 0.56 MG/DL — SIGNIFICANT CHANGE UP (ref 0.5–1.3)
EGFR: 125 ML/MIN/1.73M2 — SIGNIFICANT CHANGE UP
EOSINOPHIL # BLD AUTO: 0.06 K/UL — SIGNIFICANT CHANGE UP (ref 0–0.5)
EOSINOPHIL NFR BLD AUTO: 1.1 % — SIGNIFICANT CHANGE UP (ref 0–6)
ESTIMATED AVERAGE GLUCOSE: 80 — SIGNIFICANT CHANGE UP
FERRITIN SERPL-MCNC: 601 NG/ML — HIGH (ref 30–400)
FOLATE SERPL-MCNC: >20 NG/ML — HIGH (ref 3.1–17.5)
GGT SERPL-CCNC: 31 U/L — SIGNIFICANT CHANGE UP (ref 8–61)
GLUCOSE SERPL-MCNC: 80 MG/DL — SIGNIFICANT CHANGE UP (ref 70–99)
HCT VFR BLD CALC: 38.3 % — LOW (ref 39–50)
HCYS SERPL-MCNC: 10 UMOL/L — SIGNIFICANT CHANGE UP
HGB BLD-MCNC: 13 G/DL — SIGNIFICANT CHANGE UP (ref 13–17)
IANC: 3.49 K/UL — SIGNIFICANT CHANGE UP (ref 1.8–7.4)
IMM GRANULOCYTES NFR BLD AUTO: 0.4 % — SIGNIFICANT CHANGE UP (ref 0–0.9)
INR BLD: 1.05 RATIO — SIGNIFICANT CHANGE UP (ref 0.85–1.18)
IRON SATN MFR SERPL: 146 UG/DL — SIGNIFICANT CHANGE UP (ref 45–165)
IRON SATN MFR SERPL: 69 % — HIGH (ref 14–50)
LACTATE SERPL-SCNC: 0.7 MMOL/L — SIGNIFICANT CHANGE UP (ref 0.5–2)
LYMPHOCYTES # BLD AUTO: 1.3 K/UL — SIGNIFICANT CHANGE UP (ref 1–3.3)
LYMPHOCYTES # BLD AUTO: 24.2 % — SIGNIFICANT CHANGE UP (ref 13–44)
MAGNESIUM SERPL-MCNC: 1.6 MG/DL — SIGNIFICANT CHANGE UP (ref 1.6–2.6)
MCHC RBC-ENTMCNC: 32.2 PG — SIGNIFICANT CHANGE UP (ref 27–34)
MCHC RBC-ENTMCNC: 33.9 GM/DL — SIGNIFICANT CHANGE UP (ref 32–36)
MCV RBC AUTO: 94.8 FL — SIGNIFICANT CHANGE UP (ref 80–100)
MONOCYTES # BLD AUTO: 0.46 K/UL — SIGNIFICANT CHANGE UP (ref 0–0.9)
MONOCYTES NFR BLD AUTO: 8.6 % — SIGNIFICANT CHANGE UP (ref 2–14)
NEUTROPHILS # BLD AUTO: 3.49 K/UL — SIGNIFICANT CHANGE UP (ref 1.8–7.4)
NEUTROPHILS NFR BLD AUTO: 65 % — SIGNIFICANT CHANGE UP (ref 43–77)
NRBC # BLD: 0 /100 WBCS — SIGNIFICANT CHANGE UP (ref 0–0)
NRBC # FLD: 0 K/UL — SIGNIFICANT CHANGE UP (ref 0–0)
PHOSPHATE SERPL-MCNC: 4.1 MG/DL — SIGNIFICANT CHANGE UP (ref 2.5–4.5)
PLATELET # BLD AUTO: 177 K/UL — SIGNIFICANT CHANGE UP (ref 150–400)
POTASSIUM SERPL-MCNC: 3.9 MMOL/L — SIGNIFICANT CHANGE UP (ref 3.5–5.3)
POTASSIUM SERPL-SCNC: 3.9 MMOL/L — SIGNIFICANT CHANGE UP (ref 3.5–5.3)
PROT SERPL-MCNC: 7.5 G/DL — SIGNIFICANT CHANGE UP (ref 6–8.3)
PROTHROM AB SERPL-ACNC: 11.7 SEC — SIGNIFICANT CHANGE UP (ref 9.5–13)
RBC # BLD: 4.04 M/UL — LOW (ref 4.2–5.8)
RBC # BLD: 4.04 M/UL — LOW (ref 4.2–5.8)
RBC # FLD: 13 % — SIGNIFICANT CHANGE UP (ref 10.3–14.5)
RETICS #: 92.9 K/UL — SIGNIFICANT CHANGE UP (ref 25–125)
RETICS/RBC NFR: 2.3 % — SIGNIFICANT CHANGE UP (ref 0.5–2.5)
SODIUM SERPL-SCNC: 139 MMOL/L — SIGNIFICANT CHANGE UP (ref 135–145)
TIBC SERPL-MCNC: 211 UG/DL — LOW (ref 220–430)
TSH SERPL-MCNC: 4.79 UIU/ML — HIGH (ref 0.27–4.2)
UIBC SERPL-MCNC: 65 UG/DL — LOW (ref 110–370)
VIT B12 SERPL-MCNC: 690 PG/ML — SIGNIFICANT CHANGE UP (ref 200–900)
WBC # BLD: 5.37 K/UL — SIGNIFICANT CHANGE UP (ref 3.8–10.5)
WBC # FLD AUTO: 5.37 K/UL — SIGNIFICANT CHANGE UP (ref 3.8–10.5)

## 2023-07-27 PROCEDURE — 99239 HOSP IP/OBS DSCHRG MGMT >30: CPT

## 2023-07-27 PROCEDURE — 93010 ELECTROCARDIOGRAM REPORT: CPT

## 2023-07-27 RX ORDER — FOLIC ACID 0.8 MG
1 TABLET ORAL
Qty: 30 | Refills: 0
Start: 2023-07-27 | End: 2023-08-25

## 2023-07-27 RX ORDER — ACETAMINOPHEN 500 MG
2 TABLET ORAL
Qty: 0 | Refills: 0 | DISCHARGE
Start: 2023-07-27

## 2023-07-27 RX ORDER — APIXABAN 2.5 MG/1
1 TABLET, FILM COATED ORAL
Qty: 60 | Refills: 0
Start: 2023-07-27 | End: 2023-08-25

## 2023-07-27 RX ORDER — APIXABAN 2.5 MG/1
10 TABLET, FILM COATED ORAL EVERY 12 HOURS
Refills: 0 | Status: DISCONTINUED | OUTPATIENT
Start: 2023-07-27 | End: 2023-07-27

## 2023-07-27 RX ORDER — IBUPROFEN 200 MG
600 TABLET ORAL ONCE
Refills: 0 | Status: COMPLETED | OUTPATIENT
Start: 2023-07-27 | End: 2023-07-27

## 2023-07-27 RX ORDER — APIXABAN 2.5 MG/1
2 TABLET, FILM COATED ORAL
Qty: 120 | Refills: 0
Start: 2023-07-27 | End: 2023-08-25

## 2023-07-27 RX ORDER — APIXABAN 2.5 MG/1
5 TABLET, FILM COATED ORAL EVERY 12 HOURS
Refills: 0 | Status: DISCONTINUED | OUTPATIENT
Start: 2023-07-27 | End: 2023-07-27

## 2023-07-27 RX ORDER — PANTOPRAZOLE SODIUM 20 MG/1
40 TABLET, DELAYED RELEASE ORAL
Refills: 0 | Status: DISCONTINUED | OUTPATIENT
Start: 2023-07-27 | End: 2023-07-27

## 2023-07-27 RX ORDER — PANTOPRAZOLE SODIUM 20 MG/1
1 TABLET, DELAYED RELEASE ORAL
Qty: 30 | Refills: 0
Start: 2023-07-27 | End: 2023-08-25

## 2023-07-27 RX ADMIN — SODIUM CHLORIDE 125 MILLILITER(S): 9 INJECTION, SOLUTION INTRAVENOUS at 00:50

## 2023-07-27 RX ADMIN — Medication 600 MILLIGRAM(S): at 07:58

## 2023-07-27 RX ADMIN — Medication 1 MILLIGRAM(S): at 12:21

## 2023-07-27 RX ADMIN — Medication 100 MILLIGRAM(S): at 12:21

## 2023-07-27 RX ADMIN — Medication 600 MILLIGRAM(S): at 08:30

## 2023-07-27 RX ADMIN — AMPICILLIN SODIUM AND SULBACTAM SODIUM 200 GRAM(S): 250; 125 INJECTION, POWDER, FOR SUSPENSION INTRAMUSCULAR; INTRAVENOUS at 09:07

## 2023-07-27 RX ADMIN — Medication 1 TABLET(S): at 12:20

## 2023-07-27 RX ADMIN — AMPICILLIN SODIUM AND SULBACTAM SODIUM 200 GRAM(S): 250; 125 INJECTION, POWDER, FOR SUSPENSION INTRAMUSCULAR; INTRAVENOUS at 03:21

## 2023-07-27 RX ADMIN — APIXABAN 10 MILLIGRAM(S): 2.5 TABLET, FILM COATED ORAL at 09:08

## 2023-07-27 RX ADMIN — AMPICILLIN SODIUM AND SULBACTAM SODIUM 200 GRAM(S): 250; 125 INJECTION, POWDER, FOR SUSPENSION INTRAMUSCULAR; INTRAVENOUS at 15:03

## 2023-07-27 NOTE — DISCHARGE NOTE PROVIDER - CARE PROVIDER_API CALL
Your, Primary doctor  Phone: (   )    -  Fax: (   )    -  Follow Up Time: 1 week    The, Podiatrist  Phone: (   )    -  Fax: (   )    -  Follow Up Time: 1 week

## 2023-07-27 NOTE — PHYSICAL THERAPY INITIAL EVALUATION ADULT - ACTIVE RANGE OF MOTION EXAMINATION, REHAB EVAL
except right ankle df/pf 0-5 degrees/bilateral upper extremity Active ROM was WFL (within functional limits)/bilateral  lower extremity Active ROM was WFL (within functional limits)

## 2023-07-27 NOTE — DISCHARGE NOTE PROVIDER - NSDCCPTREATMENT_GEN_ALL_CORE_FT
PRINCIPAL PROCEDURE  Procedure: Ultrasound of right lower extremity for deep vein thrombosis (DVT)  Findings and Treatment: FINDINGS:  There is normal compressibility of the right common femoral, femoral and   popliteal veins.  The contralateral common femoral vein is patent.  Doppler examination shows normal spontaneous and phasic flow.  The peroneal and soleal veins are incompressible and demonstrate   intraluminal echogenic material.  IMPRESSION:  Acute deep venous thrombosis: below the knee.

## 2023-07-27 NOTE — DISCHARGE NOTE PROVIDER - HOSPITAL COURSE
42M with alcohol use disorder and bunionectomy 6/2023 presents with right foot swelling. Pt seen by podiatry and found to have wound dehisence of bunionectomy. Recommend weight bearing as tolerated in surgical shoe along with augmentin for ten days. LE doppler was positive for DVT and he was started on eliquis for anticoagulation. Pt was instructed to follow up with podiatry and his primary care doctor within 2 weeks of discharge.     All questions answered. Pt stable for d/c home.     To Do:  [ ] start eliquis as directed- 10mg BID for 7 days, followed by 5mg BID   [ ] complete augmentin for 10 days  [ ] follow up with PMD in 1-2 weeks  [ ] follow up with podiatry in 1-2 weeks

## 2023-07-27 NOTE — DISCHARGE NOTE PROVIDER - PROVIDER TOKENS
FREE:[LAST:[Your],FIRST:[Primary doctor],PHONE:[(   )    -],FAX:[(   )    -],FOLLOWUP:[1 week]],FREE:[LAST:[The],FIRST:[Podiatrist],PHONE:[(   )    -],FAX:[(   )    -],FOLLOWUP:[1 week]]

## 2023-07-27 NOTE — DISCHARGE NOTE NURSING/CASE MANAGEMENT/SOCIAL WORK - NSDCPEFALRISK_GEN_ALL_CORE
For information on Fall & Injury Prevention, visit: [email or web address] OR  [email or web address] OR  https://www.cdc.gov/steadi/patient.html

## 2023-07-27 NOTE — DISCHARGE NOTE PROVIDER - NSDCFUADDAPPT_GEN_ALL_CORE_FT
================================  Podiatry Discharge Instructions:  - Follow up: Please follow up with Dr. Nick Ca within 1 week of discharge from the hospital, please call [phone] or [phone] for appointment and discuss that you recently were seen in the hospital.  - Wound Care: please apply Mupirocin to R foot wound followed by 4x4 Gauze, and Kerlex. DAILY   - Weight bearing: Please weight bearing as tolerated to heel in a surgical shoe to R foot   - Antibiotics: augmentin 875 X 10 days  ================================

## 2023-07-27 NOTE — DISCHARGE NOTE PROVIDER - ATTENDING DISCHARGE PHYSICAL EXAMINATION:
T(C): 36.6 (07-27-23 @ 06:25), Max: 36.7 (07-26-23 @ 20:01)  HR: 64 (07-27-23 @ 06:25) (64 - 83)  BP: 110/80 (07-27-23 @ 06:25) (110/80 - 132/89)  RR: 18 (07-27-23 @ 06:25) (16 - 18)  SpO2: 99% (07-27-23 @ 06:25) (99% - 100%)    CONSTITUTIONAL: Well groomed, no apparent distress  EYES: PERRLA and symmetric, EOMI, No conjunctival or scleral injection, non-icteric  ENMT: Oral mucosa with moist membranes. Normal dentition; no pharyngeal injection or exudates             NECK: Supple, symmetric and without tracheal deviation   RESP: No respiratory distress, no use of accessory muscles; CTA b/l, no WRR  CV: RRR, +S1S2, no MRG; no JVD; no peripheral edema  GI: Soft, NT, ND, no rebound, no guarding; no palpable masses; no hepatosplenomegaly; no hernia palpated  LYMPH: No cervical LAD or tenderness; no axillary LAD or tenderness; no inguinal LAD or tenderness  MSK: Normal gait; No digital clubbing or cyanosis; examination of the (head/neck/spine/ribs/pelvis, RUE, LUE, RLE, LLE) without misalignment,            Normal ROM without pain, no spinal tenderness, normal muscle strength/tone  SKIN: No rashes or ulcers noted; no subcutaneous nodules or induration palpable; RLE surgical site dressing C/D/I  NEURO: CN II-XII intact; normal reflexes in upper and lower extremities, sensation intact in upper and lower extremities b/l to light touch   PSYCH: Appropriate insight/judgment; A+O x 3, mood and affect appropriate, recent/remote memory intact T(C): 36.6 (07-27-23 @ 06:25), Max: 36.7 (07-26-23 @ 20:01)  HR: 64 (07-27-23 @ 06:25) (64 - 83)  BP: 110/80 (07-27-23 @ 06:25) (110/80 - 132/89)  RR: 18 (07-27-23 @ 06:25) (16 - 18)  SpO2: 99% (07-27-23 @ 06:25) (99% - 100%)    CONSTITUTIONAL: Well groomed, no apparent distress  EYES: PERRLA and symmetric, EOMI, No conjunctival or scleral injection, non-icteric  ENMT: Oral mucosa with moist membranes. Normal dentition; no pharyngeal injection or exudates             NECK: Supple, symmetric and without tracheal deviation   RESP: No respiratory distress, no use of accessory muscles; CTA b/l, no WRR  CV: RRR, +S1S2, no MRG; no JVD; no peripheral edema  GI: Soft, NT, ND, no rebound, no guarding; no palpable masses; no hepatosplenomegaly; no hernia palpated  LYMPH: No cervical LAD or tenderness; no axillary LAD or tenderness; no inguinal LAD or tenderness  MSK: Normal gait; No digital clubbing or cyanosis; examination of the (head/neck/spine/ribs/pelvis, RUE, LUE, RLE, LLE) without misalignment,            Normal ROM without pain, no spinal tenderness, normal muscle strength/tone  SKIN: No rashes or ulcers noted; no subcutaneous nodules or induration palpable; RLE surgical site dressing C/D/I  NEURO: CN II-XII intact; normal reflexes in upper and lower extremities, sensation intact in upper and lower extremities b/l to light touch; no tremors or tongue fasciculations  PSYCH: Appropriate insight/judgment; A+O x 3, mood and affect appropriate, recent/remote memory intact

## 2023-07-27 NOTE — PHYSICAL THERAPY INITIAL EVALUATION ADULT - PERTINENT HX OF CURRENT PROBLEM, REHAB EVAL
42 year old male, with past history significant for Alcohol abuse, and Bunionectomy (06/2023), presented to the ED secondary to right foot swelling.

## 2023-07-27 NOTE — PROGRESS NOTE ADULT - ASSESSMENT
A 41 yo s/p 6/25 bunion surgery presented with the right foot pain and swelling  - Pt seen and evaluated.  - Afebrile, WBC 5.37, ESR 8, CRP 0.46  - R lower extremity diffuse pitting +1 edema extending from the toes to the level of mid-calf, Right foot s/p 6/25 bunion surgery,  incision mild dehiscence to proximal aspect to subQ, periwound erythema, no drainage, no purulence, no malodor, no crepitation, medial wound to dermis along the medial distal 1st metatarsal, periwound erythema, no probing, no drainage. L foot no open wounds, no signs of acute infection.     - R foot Xray:  Comminuted fracture of the distal shaft of the first metatarsal  - R foot venous duplex: Acute DVT.  - R foot wound culture Pending.  - Recommended Augmentin PO for 10 days  - Vasc eval pending   - Weight bearing as tolerated to the heel of right foot in a surgical shoe  - Pt is stable for discharge from pod standpoint pending Vasc recs, follow up with his podiatrist upon discharge within 1 week.  - Discussed with attending

## 2023-07-27 NOTE — PHYSICAL THERAPY INITIAL EVALUATION ADULT - HEALTH SCREEN CRITERIA
"Chief Complaint  Establish Care, COPD, Chronic Kidney Disease, and Exposure To Known Illness (no symptoms, had both vaccines and booster)    Subjective          Latanya Olsen presents to [facility]  Here to establish.  Had been seeing Dr. Perkins and that was not working out.  Sees Dr. Hunter for chronic kidney disease.  Has been concerned about her CKD and ? DM.  Urine has had an odor for over a month.  Had some black stool about 2 weeks ago.  Does not know when her last colonoscopy was.  She has been on Eliquis since the spring for DVT.  She has seen hematology.  She has been concerned about some odor in her urine.  This is been going on for a month.  She has had a couple hospitalizations and nursing home stays this year her first DVT was so extensive they had to do a thrombectomy.  She had a new thrombosis while on Coumadin with an INR of 2.1.    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  Was exposed top someone Saturday with Covid.  She has not had any symptoms and has had 3 shots.          Objective   Vital Signs:   /60   Pulse 97   Temp 98.4 °F (36.9 °C)   Ht 167.6 cm (66\")   Wt 94.8 kg (209 lb)   SpO2 95%   BMI 33.73 kg/m²     Physical Exam  Constitutional:       General: She is not in acute distress.     Appearance: Normal appearance. She is well-developed. She is obese.   HENT:      Head: Normocephalic and atraumatic.      Right Ear: External ear normal.      Left Ear: External ear normal.      Mouth/Throat:      Mouth: Mucous membranes are moist.      Pharynx: Oropharynx is clear.   Eyes:      Conjunctiva/sclera: Conjunctivae normal.      Pupils: Pupils are equal, round, and reactive to light.   Neck:      Thyroid: No thyromegaly.   Cardiovascular:      Rate and Rhythm: Normal rate and regular rhythm.      Heart sounds: No murmur heard.     Pulmonary:      Effort: Pulmonary effort is normal.      Breath sounds: Normal breath sounds. No " wheezing.   Abdominal:      General: Bowel sounds are normal.      Palpations: Abdomen is soft.      Tenderness: There is no abdominal tenderness.   Musculoskeletal:         General: Normal range of motion.      Cervical back: Neck supple.      Right lower leg: Edema present.      Left lower leg: Edema present.      Comments: Support hose in place   Lymphadenopathy:      Cervical: No cervical adenopathy.   Skin:     General: Skin is warm and dry.   Neurological:      Mental Status: She is alert and oriented to person, place, and time.   Psychiatric:         Mood and Affect: Mood normal.         Behavior: Behavior normal.        Result Review :   The following data was reviewed by: Meredith Lea Kehrer, MD on 09/20/2021:  UA    Urinalysis 3/6/21 3/6/21 3/6/21 9/20/21    [id number]    Squamous Epithelial Cells, UA   0-2    Specific [place], UA 1.015 1.015     Ketones, UA Negative Negative  Negative   Blood, UA Negative Negative     Leukocytes, UA Negative Trace (A)  Negative   Nitrite, UA Negative Negative     RBC, UA   0-2    WBC, UA   0-2    Bacteria, UA   None Seen    (A) Abnormal value                 Office Visit with Delfino Scruggs MD (05/14/2021)  Homocysteine, plasma (05/14/2021 15:16)  Comprehensive Metabolic Panel (05/14/2021 15:16)  CBC & Differential (05/14/2021 15:16)       Assessment and Plan    Diagnoses and all orders for this visit:    1. Exposure to COVID-19 virus (Primary)  -     COVID-19,[facility] ROUTINE, NP/OP SWAB IN TRANSPORT MEDIA OR ESWAB 72 HR TAT - Swab, Oropharynx    2. Urinary body odor  -     POC Urinalysis Dipstick, Automated  -     Urine Culture - Urine, Urine, Clean Catch    3. Abnormal glucose  -     Hemoglobin A1c    4. Iron deficiency anemia, unspecified iron deficiency anemia type  -     CBC & Differential  -     Iron  -     Ferritin    5. Stage 3a chronic kidney disease (CMS/HCC)  -     Basic Metabolic Panel    6. Age-related osteoporosis without current pathological  fracture    7. Edema, unspecified type    8. Proteinuria, unspecified type  -     Protein, Urine, 24 Hour - Urine, Clean Catch; Future  -     Urine Culture - Urine, Urine, Clean Catch    Distant exposure to Covid-we will get tested today  Urinary body odor-we will check a culture  Abnormal glucose check A1c  Iron deficiency anemia-recheck labs today  Stage III CKD-check BMP  Osteoporosis-we will await old records to make decision  Edema-review old records  Proteinuria-check urine culture and 24 urine  We will do more for the patient when we get her records together for her Medicare wellness exam    Follow Up   Return in about 6 weeks (around 11/1/2021) for Medicare Wellness.  Patient was given instructions and counseling regarding her condition or for health maintenance advice. Please see specific information pulled into the AVS if appropriate.        yes

## 2023-07-27 NOTE — DISCHARGE NOTE NURSING/CASE MANAGEMENT/SOCIAL WORK - NSDCFUADDAPPT_GEN_ALL_CORE_FT
================================  Podiatry Discharge Instructions:  - Follow up: Please follow up with Dr. Nick Ca within 1 week of discharge from the hospital, please call [phone] or [phone] for appointment and discuss that you recently were seen in the hospital.  - Wound Care: please apply Mupirocin to R foot wound followed by 4x4 Gauze, and Kerlex. DAILY   - Weight bearing: Please weight bearing as tolerated to heel in a surgical shoe to R foot   - Antibiotics: augmentin 875 X 10 days  ================================

## 2023-07-27 NOTE — DISCHARGE NOTE PROVIDER - NSDCMRMEDTOKEN_GEN_ALL_CORE_FT
vitamin B complex:   vitamin D:    acetaminophen 325 mg oral tablet: 2 tab(s) orally every 6 hours As needed Mild Pain (1 - 3)  apixaban 5 mg oral tablet: 2 tab(s) orally every 12 hours Please take 10mg twice a day for 7 days, followed by 5mg twice a day after that  folic acid 1 mg oral tablet: 1 tab(s) orally once a day  Multiple Vitamins oral tablet: 1 tab(s) orally once a day  pantoprazole 40 mg oral delayed release tablet: 1 tab(s) orally once a day (before a meal)  vitamin B complex:   vitamin D:    acetaminophen 325 mg oral tablet: 2 tab(s) orally every 6 hours As needed Mild Pain (1 - 3)  amoxicillin-clavulanate 875 mg-125 mg oral tablet: 1 tab(s) orally every 12 hours  apixaban 5 mg oral tablet: 2 tab(s) orally every 12 hours Please take 10mg twice a day for 7 days, followed by 5mg twice a day after that  folic acid 1 mg oral tablet: 1 tab(s) orally once a day  Multiple Vitamins oral tablet: 1 tab(s) orally once a day  pantoprazole 40 mg oral delayed release tablet: 1 tab(s) orally once a day (before a meal)  vitamin B complex:   vitamin D:    acetaminophen 325 mg oral tablet: 2 tab(s) orally every 6 hours As needed Mild Pain (1 - 3)  amoxicillin-clavulanate 875 mg-125 mg oral tablet: 1 tab(s) orally every 12 hours  Eliquis Starter Pack for Treatment of DVT and PE 5 mg oral tablet: 1 tab(s) orally 2 times a day Please take 10mg twice a day for a total of 7 days (6 more days), followed by 5mg twice a day after that  folic acid 1 mg oral tablet: 1 tab(s) orally once a day  Multiple Vitamins oral tablet: 1 tab(s) orally once a day  pantoprazole 40 mg oral delayed release tablet: 1 tab(s) orally once a day (before a meal)  vitamin B complex:   vitamin D:

## 2023-07-27 NOTE — PROGRESS NOTE ADULT - SUBJECTIVE AND OBJECTIVE BOX
MARIAM WONG, MRN [id number], 42ymale      Patient is a 42y old  Male who presents with a chief complaint of Soft tissue infection, DVT of lower extremity (right), Alcohol dependence with withdrawal (26 Jul 2023 21:25)       INTERVAL HPI/OVERNIGHT EVENTS:  Patient seen and evaluated at bedside.  Pt is resting comfortable in NAD. Denies N/V/F/C.    Allergies    No Known Allergies    Intolerances        Vital Signs Last 24 Hrs  T(C): 36.6 (27 Jul 2023 06:25), Max: 36.7 (26 Jul 2023 20:01)  T(F): 97.9 (27 Jul 2023 06:25), Max: 98.1 (26 Jul 2023 20:01)  HR: 64 (27 Jul 2023 06:25) (64 - 83)  BP: 110/80 (27 Jul 2023 06:25) (110/80 - 132/89)  BP(mean): --  RR: 18 (27 Jul 2023 06:25) (16 - 18)  SpO2: 99% (27 Jul 2023 06:25) (99% - 100%)    Parameters below as of 27 Jul 2023 06:25  Patient On (Oxygen Delivery Method): room air        LABS:                        13.0   5.37  )-----------( 177      ( 27 Jul 2023 06:00 )             38.3     07-27    139  |  100  |  12  ----------------------------<  80  3.9   |  22  |  0.56    Ca    9.5      27 Jul 2023 06:00  Phos  4.1     07-27  Mg     1.60     07-27    TPro  7.5  /  Alb  4.3  /  TBili  1.2  /  DBili  x   /  AST  87<H>  /  ALT  63<H>  /  AlkPhos  56  07-27    PT/INR - ( 27 Jul 2023 06:00 )   PT: 11.7 sec;   INR: 1.05 ratio         PTT - ( 27 Jul 2023 06:00 )  PTT:35.4 sec  Urinalysis Basic - ( 27 Jul 2023 06:00 )    Color: x / Appearance: x / SG: x / pH: x  Gluc: 80 mg/dL / Ketone: x  / Bili: x / Urobili: x   Blood: x / Protein: x / Nitrite: x   Leuk Esterase: x / RBC: x / WBC x   Sq Epi: x / Non Sq Epi: x / Bacteria: x      CAPILLARY BLOOD GLUCOSE          Lower Extremity Physical Exam:    Vascular: DP/PT 2/4, B/L, CFT <3 seconds B/L, Temperature gradient warm to cool, B/L.   Neuro: Epicritic sensation intact to the level of digits, B/L.  Musculoskeletal/Ortho: unremarkable  Skin: R lower extremity diffuse pitting +1 edema extending from the toes to the level of mid-calf, Right foot s/p 6/25 bunion surgery,  incision mild dehiscence to proximal aspect to subQ, periwound erythema, no drainage, no purulence, no malodor, no crepitation, medial wound to dermis along the medial distal 1st metatarsal, periwound erythema, no probing, no drainage. L foot no open wounds, no signs of acute infection.         RADIOLOGY & ADDITIONAL TESTS:    ACC: [id number] EXAM:  XR FOOT COMP MIN 3 VIEWS RT   ORDERED BY: VALENCIA GOLDBERG     PROCEDURE DATE:  07/26/2023          INTERPRETATION:  CLINICAL INDICATION: Foot pain and swelling status post   lung surgery on June 25, 2023.    TECHNIQUE:  3 views of the right foot..    COMPARISON: X-ray of the left foot on August 5, 2009.    FINDINGS:  Comminuted fracture of the distal shaft of the first metatarsal.   Surrounding soft tissue edema.  Mild chronic degenerative changes in the tarsal bones.    IMPRESSION:  Comminuted fracture of the distal shaft of the first metatarsal.   Surrounding    --- End of Report ---          CHATO MASCORRO DO; Resident Radiologist  This document has been electronically signed.  RACHANA KINGSTON DO; Attending Radiologist  This document has been electronically signed. Jul 26 2023  8:40PM

## 2023-07-27 NOTE — DISCHARGE NOTE NURSING/CASE MANAGEMENT/SOCIAL WORK - PATIENT PORTAL LINK FT
You can access the FollowMyHealth Patient Portal offered by [facility] by registering at the following website: [email or web address]. By joining [facility]’s FollowMyHealth portal, you will also be able to view your health information using other applications (apps) compatible with our system.

## 2023-07-28 NOTE — PATIENT PROFILE ADULT - NSPRONUTRITIONRISK_GEN_A_NUR
Bed: [facility]  Expected date: 7/28/23  Expected time: 10:36 AM  Means of arrival:   Comments:  TP psych eval  
Pt verbalized understanding of discharge. Pt left in no distress.   
No indicators present

## 2023-07-31 RX ORDER — MUPIROCIN 20 MG/G
1 OINTMENT TOPICAL
Qty: 1 | Refills: 0
Start: 2023-07-31 | End: 2023-08-06

## 2023-07-31 NOTE — CHART NOTE - NSCHARTNOTEFT_GEN_A_CORE
Post-Discharge Medication Review  Patient was contacted to offer medication counseling post-discharge; patient declined counseling.

## 2023-08-01 LAB
CULTURE RESULTS: SIGNIFICANT CHANGE UP
SPECIMEN SOURCE: SIGNIFICANT CHANGE UP

## 2024-02-28 PROBLEM — F10.10 ALCOHOL ABUSE, UNCOMPLICATED: Chronic | Status: ACTIVE | Noted: 2023-07-26

## 2024-03-07 ENCOUNTER — APPOINTMENT (OUTPATIENT)
Dept: ORTHOPEDIC SURGERY | Facility: CLINIC | Age: 44
End: 2024-03-07
Payer: COMMERCIAL

## 2024-03-07 VITALS
DIASTOLIC BLOOD PRESSURE: 61 MMHG | BODY MASS INDEX: 23.7 KG/M2 | HEART RATE: 64 BPM | TEMPERATURE: 97.3 F | HEIGHT: 69 IN | OXYGEN SATURATION: 98 % | SYSTOLIC BLOOD PRESSURE: 99 MMHG | WEIGHT: 160 LBS

## 2024-03-07 DIAGNOSIS — M17.12 UNILATERAL PRIMARY OSTEOARTHRITIS, LEFT KNEE: ICD-10-CM

## 2024-03-07 PROCEDURE — 99203 OFFICE O/P NEW LOW 30 MIN: CPT

## 2024-03-07 NOTE — DISCUSSION/SUMMARY
[de-identified] : Patient was seen today for evaluation management of chronic intermittent left knee pain with recent atraumatic exacerbation due to mild osteoarthritis.  Patient has history of right lower leg DVT approximately 1 year ago.  When he had onset of left posterior knee pain his PCP sent him for ultrasound which was negative for DVT but had positive finding of a Baker's cyst.  Limited diagnostic ultrasound was performed in the left popliteal space today, there is a very small Baker's cyst noted, do not feel this is of sufficient size to be symptomatic to the patient.  Do not recommend aspiration at this time as I feel this is an incidental finding.  The patient's intermittent knee pain is likely due to some mild osteoarthritis and patellofemoral chondromalacia.  Patient will be started on a course of physical therapy to restore normal range of motion and strength as tolerated.  Recommend trial of over-the-counter joint supplement of glucosamine/chondroitin.  If patient has persisting pain may consider x-ray at next office visit to quantify the amount of osteoarthritis present, and may consider insurance authorization for hyaluronic acid injection therapy as most long-term preventative treatment option.  No indication for MRI at this time as patient has no surgical indications.  He may continue his regular work duties as a .  Follow up as needed.  Patient appreciates and agrees with current plan.  This note was generated using dragon medical dictation software.  A reasonable effort has been made for proofreading its contents, but typos may still remain.  If there are any questions or points of clarification needed please notify my office.

## 2024-03-07 NOTE — HISTORY OF PRESENT ILLNESS
[de-identified] : Mr. MARIAM WONG  is a 43 year old male who presents with a few weeks of posterior left knee pain.  His pain is worse when he squats.  He did an US to rule out a DVT since he had one on his right leg.  It was negative but he was told he has a cyst.

## 2024-03-07 NOTE — PHYSICAL EXAM
[de-identified] : Constitutional: Well-nourished, well-developed, No acute distress Respiratory:  Good respiratory effort, no SOB Lymphatic: No regional lymphadenopathy, no lymphedema Psychiatric: Pleasant and normal affect, alert and oriented x3 Musculoskeletal: normal except where as noted in regional exam  Left knee: APPEARANCE: no marked deformities, no swelling or malalignment POSITIVE TENDERNESS:  + crepitus of the anterior knee, and tenderness of patellar retinaculum NONTENDER: jt lines b/l, patellar & quadriceps tendons, MCL/LCL, ITB at the lateral femoral condyle & Gerdy's tubercle, pes bursa.  ROM: full with some discomfort in deep knee flexion RESISTIVE TESTING: + discomfort with knee ext from deep knee flexion (stretched position), painless knee flexion.  SPECIAL TESTS: stable v/v stress. painless grind. neg Lachman's. neg ant/post drawer. neg Yesica's.

## 2024-04-25 ENCOUNTER — APPOINTMENT (OUTPATIENT)
Dept: ORTHOPEDIC SURGERY | Facility: CLINIC | Age: 44
End: 2024-04-25

## 2024-05-09 ENCOUNTER — TRANSCRIPTION ENCOUNTER (OUTPATIENT)
Age: 44
End: 2024-05-09

## 2024-05-09 ENCOUNTER — INPATIENT (INPATIENT)
Facility: HOSPITAL | Age: 44
LOS: 2 days | Discharge: HOME | End: 2024-05-12
Attending: STUDENT IN AN ORGANIZED HEALTH CARE EDUCATION/TRAINING PROGRAM | Admitting: STUDENT IN AN ORGANIZED HEALTH CARE EDUCATION/TRAINING PROGRAM
Payer: COMMERCIAL

## 2024-05-09 VITALS
TEMPERATURE: 98 F | RESPIRATION RATE: 18 BRPM | SYSTOLIC BLOOD PRESSURE: 142 MMHG | HEART RATE: 63 BPM | OXYGEN SATURATION: 97 % | DIASTOLIC BLOOD PRESSURE: 76 MMHG

## 2024-05-09 DIAGNOSIS — Z91.89 OTHER SPECIFIED PERSONAL RISK FACTORS, NOT ELSEWHERE CLASSIFIED: ICD-10-CM

## 2024-05-09 DIAGNOSIS — Z98.890 OTHER SPECIFIED POSTPROCEDURAL STATES: Chronic | ICD-10-CM

## 2024-05-09 DIAGNOSIS — Z29.9 ENCOUNTER FOR PROPHYLACTIC MEASURES, UNSPECIFIED: ICD-10-CM

## 2024-05-09 DIAGNOSIS — F10.10 ALCOHOL ABUSE, UNCOMPLICATED: ICD-10-CM

## 2024-05-09 DIAGNOSIS — I82.409 ACUTE EMBOLISM AND THROMBOSIS OF UNSPECIFIED DEEP VEINS OF UNSPECIFIED LOWER EXTREMITY: ICD-10-CM

## 2024-05-09 DIAGNOSIS — F41.9 ANXIETY DISORDER, UNSPECIFIED: ICD-10-CM

## 2024-05-09 DIAGNOSIS — R09.89 OTHER SPECIFIED SYMPTOMS AND SIGNS INVOLVING THE CIRCULATORY AND RESPIRATORY SYSTEMS: ICD-10-CM

## 2024-05-09 DIAGNOSIS — S46.219A STRAIN OF MUSCLE, FASCIA AND TENDON OF OTHER PARTS OF BICEPS, UNSPECIFIED ARM, INITIAL ENCOUNTER: Chronic | ICD-10-CM

## 2024-05-09 LAB
ALBUMIN SERPL ELPH-MCNC: 4.7 G/DL — SIGNIFICANT CHANGE UP (ref 3.3–5)
ALP SERPL-CCNC: 59 U/L — SIGNIFICANT CHANGE UP (ref 40–120)
ALT FLD-CCNC: 37 U/L — SIGNIFICANT CHANGE UP (ref 4–41)
ANION GAP SERPL CALC-SCNC: 16 MMOL/L — HIGH (ref 7–14)
AST SERPL-CCNC: 47 U/L — HIGH (ref 4–40)
BASOPHILS # BLD AUTO: 0.05 K/UL — SIGNIFICANT CHANGE UP (ref 0–0.2)
BASOPHILS NFR BLD AUTO: 1.1 % — SIGNIFICANT CHANGE UP (ref 0–2)
BILIRUB SERPL-MCNC: 0.7 MG/DL — SIGNIFICANT CHANGE UP (ref 0.2–1.2)
BUN SERPL-MCNC: 12 MG/DL — SIGNIFICANT CHANGE UP (ref 7–23)
CALCIUM SERPL-MCNC: 8.6 MG/DL — SIGNIFICANT CHANGE UP (ref 8.4–10.5)
CHLORIDE SERPL-SCNC: 100 MMOL/L — SIGNIFICANT CHANGE UP (ref 98–107)
CO2 SERPL-SCNC: 23 MMOL/L — SIGNIFICANT CHANGE UP (ref 22–31)
CREAT SERPL-MCNC: 0.7 MG/DL — SIGNIFICANT CHANGE UP (ref 0.5–1.3)
EGFR: 116 ML/MIN/1.73M2 — SIGNIFICANT CHANGE UP
EOSINOPHIL # BLD AUTO: 0.03 K/UL — SIGNIFICANT CHANGE UP (ref 0–0.5)
EOSINOPHIL NFR BLD AUTO: 0.7 % — SIGNIFICANT CHANGE UP (ref 0–6)
FLUAV AG NPH QL: SIGNIFICANT CHANGE UP
FLUBV AG NPH QL: SIGNIFICANT CHANGE UP
GLUCOSE SERPL-MCNC: 140 MG/DL — HIGH (ref 70–99)
HCT VFR BLD CALC: 38 % — LOW (ref 39–50)
HGB BLD-MCNC: 13.4 G/DL — SIGNIFICANT CHANGE UP (ref 13–17)
IANC: 2.21 K/UL — SIGNIFICANT CHANGE UP (ref 1.8–7.4)
IMM GRANULOCYTES NFR BLD AUTO: 0.2 % — SIGNIFICANT CHANGE UP (ref 0–0.9)
LYMPHOCYTES # BLD AUTO: 1.87 K/UL — SIGNIFICANT CHANGE UP (ref 1–3.3)
LYMPHOCYTES # BLD AUTO: 40.7 % — SIGNIFICANT CHANGE UP (ref 13–44)
MAGNESIUM SERPL-MCNC: 2.1 MG/DL — SIGNIFICANT CHANGE UP (ref 1.6–2.6)
MCHC RBC-ENTMCNC: 33.2 PG — SIGNIFICANT CHANGE UP (ref 27–34)
MCHC RBC-ENTMCNC: 35.3 GM/DL — SIGNIFICANT CHANGE UP (ref 32–36)
MCV RBC AUTO: 94.1 FL — SIGNIFICANT CHANGE UP (ref 80–100)
MONOCYTES # BLD AUTO: 0.42 K/UL — SIGNIFICANT CHANGE UP (ref 0–0.9)
MONOCYTES NFR BLD AUTO: 9.2 % — SIGNIFICANT CHANGE UP (ref 2–14)
NEUTROPHILS # BLD AUTO: 2.21 K/UL — SIGNIFICANT CHANGE UP (ref 1.8–7.4)
NEUTROPHILS NFR BLD AUTO: 48.1 % — SIGNIFICANT CHANGE UP (ref 43–77)
NRBC # BLD: 0 /100 WBCS — SIGNIFICANT CHANGE UP (ref 0–0)
NRBC # FLD: 0 K/UL — SIGNIFICANT CHANGE UP (ref 0–0)
PLATELET # BLD AUTO: 178 K/UL — SIGNIFICANT CHANGE UP (ref 150–400)
POTASSIUM SERPL-MCNC: 4 MMOL/L — SIGNIFICANT CHANGE UP (ref 3.5–5.3)
POTASSIUM SERPL-SCNC: 4 MMOL/L — SIGNIFICANT CHANGE UP (ref 3.5–5.3)
PROT SERPL-MCNC: 7.2 G/DL — SIGNIFICANT CHANGE UP (ref 6–8.3)
RBC # BLD: 4.04 M/UL — LOW (ref 4.2–5.8)
RBC # FLD: 12.6 % — SIGNIFICANT CHANGE UP (ref 10.3–14.5)
RSV RNA NPH QL NAA+NON-PROBE: SIGNIFICANT CHANGE UP
SARS-COV-2 RNA SPEC QL NAA+PROBE: SIGNIFICANT CHANGE UP
SODIUM SERPL-SCNC: 139 MMOL/L — SIGNIFICANT CHANGE UP (ref 135–145)
WBC # BLD: 4.59 K/UL — SIGNIFICANT CHANGE UP (ref 3.8–10.5)
WBC # FLD AUTO: 4.59 K/UL — SIGNIFICANT CHANGE UP (ref 3.8–10.5)

## 2024-05-09 PROCEDURE — 99223 1ST HOSP IP/OBS HIGH 75: CPT | Mod: GC

## 2024-05-09 PROCEDURE — 93970 EXTREMITY STUDY: CPT | Mod: 26

## 2024-05-09 PROCEDURE — 99285 EMERGENCY DEPT VISIT HI MDM: CPT

## 2024-05-09 RX ORDER — THIAMINE MONONITRATE (VIT B1) 100 MG
100 TABLET ORAL DAILY
Refills: 0 | Status: COMPLETED | OUTPATIENT
Start: 2024-05-09 | End: 2024-05-12

## 2024-05-09 RX ORDER — MELOXICAM 15 MG/1
1 TABLET ORAL
Refills: 0 | DISCHARGE

## 2024-05-09 RX ORDER — ACETAMINOPHEN 500 MG
650 TABLET ORAL EVERY 6 HOURS
Refills: 0 | Status: DISCONTINUED | OUTPATIENT
Start: 2024-05-09 | End: 2024-05-12

## 2024-05-09 RX ORDER — THIAMINE MONONITRATE (VIT B1) 100 MG
100 TABLET ORAL ONCE
Refills: 0 | Status: COMPLETED | OUTPATIENT
Start: 2024-05-09 | End: 2024-05-09

## 2024-05-09 RX ORDER — SODIUM CHLORIDE 9 MG/ML
1000 INJECTION INTRAMUSCULAR; INTRAVENOUS; SUBCUTANEOUS ONCE
Refills: 0 | Status: COMPLETED | OUTPATIENT
Start: 2024-05-09 | End: 2024-05-09

## 2024-05-09 RX ORDER — FOLIC ACID 0.8 MG
1 TABLET ORAL ONCE
Refills: 0 | Status: COMPLETED | OUTPATIENT
Start: 2024-05-09 | End: 2024-05-09

## 2024-05-09 RX ORDER — ERGOCALCIFEROL 1.25 MG/1
0 CAPSULE ORAL
Refills: 0 | DISCHARGE

## 2024-05-09 RX ORDER — ENOXAPARIN SODIUM 100 MG/ML
40 INJECTION SUBCUTANEOUS EVERY 24 HOURS
Refills: 0 | Status: DISCONTINUED | OUTPATIENT
Start: 2024-05-09 | End: 2024-05-12

## 2024-05-09 RX ORDER — FOLIC ACID 0.8 MG
1 TABLET ORAL DAILY
Refills: 0 | Status: DISCONTINUED | OUTPATIENT
Start: 2024-05-09 | End: 2024-05-12

## 2024-05-09 RX ADMIN — Medication 2 MILLIGRAM(S): at 17:23

## 2024-05-09 RX ADMIN — Medication 1 MILLIGRAM(S): at 12:38

## 2024-05-09 RX ADMIN — SODIUM CHLORIDE 1000 MILLILITER(S): 9 INJECTION INTRAMUSCULAR; INTRAVENOUS; SUBCUTANEOUS at 12:37

## 2024-05-09 RX ADMIN — Medication 2 MILLIGRAM(S): at 22:05

## 2024-05-09 RX ADMIN — Medication 50 MILLIGRAM(S): at 15:25

## 2024-05-09 RX ADMIN — Medication 100 MILLIGRAM(S): at 12:38

## 2024-05-09 RX ADMIN — Medication 1 TABLET(S): at 12:39

## 2024-05-09 NOTE — H&P ADULT - PROBLEM SELECTOR PLAN 2
Hx of LE DVT in 2023 and started on Eliquis    Plan  - c/w Eliquis Hx of LE DVT in 2023 and started on Eliquis  No longer taking Eliquis  L>R leg swelling  Hx of Baker cysts    Plan  - DVT Study of right leg Hx of LE DVT in 2023 and started on Eliquis  No longer taking Eliquis  L>R leg swelling  Hx of Baker cysts for which he takes Meloxicam    Plan  - DVT Study of Left Leg Hx of LE DVT in the Right leg in 2023 and started on Eliquis  No longer taking Eliquis  L>R leg swelling  Hx of Baker cysts for which he takes Meloxicam    Plan  - DVT Study of Left Leg

## 2024-05-09 NOTE — H&P ADULT - HISTORY OF PRESENT ILLNESS
43yoM PMHx of EtOH dependence w/ hx of DT/seizures in the past (recent 1/2024), anxiety, DVT (7/2023 - on Eliquis), p/w eval for detox. He was in [place] in January and then relapsed upon turning home. He drinks 1L whiskey/vodka daily and takes lorazepam 0.5mg qd ([facility] takes it more frequently). His last drink was this AM    In the ED, vitals 98.1, 63, 142/76, RA. CBC WNL. BMP AlkPhos/AST/ALT - 59/47/37  She was given 1L NS 43yoM PMHx of EtOH dependence w/ hx of DT/seizures in the past (recent 1/2024), anxiety, DVT (7/2023) , p/w eval for detox. He was in [place] in January and then relapsed upon turning home. He drinks 1L whiskey/vodka daily and takes lorazepam 0.5mg qd (tho takes it more frequently). His last drink was this AM w/ 4-shots. 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 He otherwise denies fevers, chills, N/V, SOB, hematemesis, hematuria, melena. Of note, he endorses leg swelling L>R for which he wears compression stockings.     In the ED, vitals 98.1, 63, 142/76, RA. CBC WNL. BMP AlkPhos/AST/ALT - 59/47/37  He was given 1L NS 43yoM PMHx of EtOH dependence w/ hx of DT/seizures in the past (recent 1/2024), anxiety, DVT (7/2023) , p/w eval for detox. He was in [place] in January and then relapsed upon turning home. He drinks 1L whiskey/vodka daily and takes lorazepam 0.5mg qd (tho takes it more frequently). His last drink was this AM w/ 4-shots. 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 He otherwise denies fevers, chills, N/V, SOB, hematemesis, hematuria, melena. Of note, he endorses leg swelling L>R for which he wears compression stockings.     In the ED, vitals 98.1, 63, 142/76, RA. CBC WNL. BMP AlkPhos/AST/ALT - 59/47/37  He was given 1L NS and admitted to medicine for further management 43yoM PMHx of EtOH dependence w/ hx of DT/seizures in the past (recent 1/2024), anxiety, DVT (7/2023) , p/w eval for detox. He was in [place] in January and then relapsed upon turning home. He drinks 1L whiskey/vodka daily and takes lorazepam 0.5mg qd. His last drink was 9 AM w/ 4-shots. 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 He otherwise denies fevers, chills, N/V, SOB, hematemesis, hematuria, melena. Of note, he endorses leg swelling L>R for which he wears compression stockings.     In the ED, vitals 98.1, 63, 142/76, RA. CBC WNL. BMP AlkPhos/AST/ALT - 59/47/37  He was given 1L NS and admitted to medicine for further management 43yoM PMHx of EtOH dependence w/ hx of DT/seizures in the past (recent 1/2024), anxiety, DVT (7/2023) , p/w c/f alcohol withdrawal. He was in [place] in January and then relapsed upon turning home. He drinks 1L whiskey/vodka daily and takes lorazepam 0.5mg qd. His last drink was 9 AM w/ 4-shots. 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 He otherwise denies fevers, chills, N/V, SOB, hematemesis, hematuria, melena. Of note, he endorses leg swelling L>R for which he wears compression stockings.     In the ED, vitals 98.1, 63, 142/76, RA. CBC WNL. BMP AlkPhos/AST/ALT - 59/47/37  He was given 1L NS and admitted to medicine for further management

## 2024-05-09 NOTE — DISCHARGE NOTE PROVIDER - CARE PROVIDER_API CALL
Dennise Prince  Internal Medicine  [address]  [place] [address]  Phone: [phone]  Fax: [phone]  Follow Up Time: 2 weeks   Georgia Pinto  Internal Medicine  [address], [address]  [place] [address]  Phone: [phone]  Fax: [phone]  Established Patient  Follow Up Time: 2 weeks

## 2024-05-09 NOTE — H&P ADULT - PROBLEM SELECTOR PLAN 3
On lorazepam 0.5mg. Previously trialed buspirone On lorazepam 0.5mg. Previously trialed buspirone    Plan  - Hold lorazepam - Will be on lorazepam CIWA

## 2024-05-09 NOTE — DISCHARGE NOTE PROVIDER - CARE PROVIDERS DIRECT ADDRESSES
[email or web address]

## 2024-05-09 NOTE — ED PROVIDER NOTE - NSICDXPASTSURGICALHX_GEN_ALL_CORE_FT
PAST SURGICAL HISTORY:  Biceps tendon tear right    History of bunionectomy of left great toe     History of bunionectomy of right great toe

## 2024-05-09 NOTE — ED ADULT TRIAGE NOTE - CHIEF COMPLAINT QUOTE
Patient reports drinking .5 liter of vodka or whiskey for the past two months. Patient reports that he wants to detox, recently relapsed after finishing program. Patient reports an increase in anxiety, denies any S/I, H/I, hallucinations or depression. Last drink at 8am, patient not currently symptomatic. Patient denies any CP or SOB, respirations equal and unlabored on room air. pmhx: withdrawal, anxiety on Ativan

## 2024-05-09 NOTE — DISCHARGE NOTE PROVIDER - NSDCMRMEDTOKEN_GEN_ALL_CORE_FT
LORazepam 0.5 mg oral tablet: 1 tab(s) orally once a day  meloxicam 15 mg oral tablet: 1 tab(s) orally once a day   meloxicam 15 mg oral tablet: 1 tab(s) orally once a day

## 2024-05-09 NOTE — DISCHARGE NOTE PROVIDER - HOSPITAL COURSE
HPI:  43yoM PMHx of EtOH dependence w/ hx of DT/seizures in the past (recent 1/2024), anxiety, DVT (7/2023) , p/w eval for detox. In the ED, vitals 98.1, 63, 142/76, RA. CBC WNL. BMP AlkPhos/AST/ALT - 59/47/37 He was given 1L NS and admitted to medicine for further management    Hospital Course:  He was started on a high-risk lorazepam taper and symptom triggered CIWA given his history of seizures and DT. He completed the taper without incident. He obtained a LUE Duplex given his hx of DVT that was unremarkable.     Important Medication Changes and Reason:  - Naltrexone    Active or Pending Issues Requiring Follow-up:  - PCP     Advanced Directives:   [X] Full code  [ ] DNR  [ ] Hospice    Discharge Diagnoses:  - Alcohol withdrawal         HPI:  43yoM PMHx of EtOH dependence w/ hx of DT/seizures in the past (recent 1/2024), anxiety, DVT (7/2023) , p/w eval for detox. In the ED, vitals 98.1, 63, 142/76, RA. CBC WNL. BMP AlkPhos/AST/ALT - 59/47/37 He was given 1L NS and admitted to medicine for further management    Hospital Course:  He was started on a high-risk lorazepam taper and symptom triggered CIWA given his history of seizures and DT. He completed the taper without incident. He obtained a LUE Duplex given his hx of DVT that was unremarkable.     Important Medication Changes and Reason:  - Naltrexone    Active or Pending Issues Requiring Follow-up:  - PCP     Advanced Directives:   [X] Full code  [ ] DNR  [ ] Hospice    Discharge Diagnoses:  - Alcohol withdrawal HPI:  43yoM PMHx of EtOH dependence w/ hx of seizure? in the past (recent 1/2024), anxiety, DVT (7/2023) , p/w eval for detox. In the ED, vitals 98.1, 63, 142/76, RA. CBC WNL. BMP AlkPhos/AST/ALT - 59/47/37 He was given 1L NS and admitted to medicine for further management    Hospital Course:  He was started on a high-risk lorazepam taper and symptom triggered CIWA given his history of seizures and DT. He completed the taper without incident. He obtained a LUE Duplex given his hx of DVT that was unremarkable.     Important Medication Changes and Reason:  - N/A    Active or Pending Issues Requiring Follow-up:  - PCP     Advanced Directives:   [X] Full code  [ ] DNR  [ ] Hospice    Discharge Diagnoses:  - Alcohol withdrawal HPI:  43yoM PMHx of EtOH dependence w/ hx of seizure? in the past (recent 1/2024), anxiety, DVT (7/2023) , p/w eval for detox. In the ED, vitals 98.1, 63, 142/76, RA. CBC WNL. BMP AlkPhos/AST/ALT - 59/47/37 He was given 1L NS and admitted to medicine for further management    Hospital Course:  He was started on a high-risk lorazepam taper and symptom triggered CIWA given his history of seizures and DT. It was later clarified he may have had a syncopal episode instead of a seizure. His CIWAs were initially 4 then 1; he was monitored off the taper and only on symptom triggered CIWA. After a day, his VSS, CIWAS 0/1, and stable for discharge with naltrexone and outpatient follow up with his PCP. He completed the taper without incident. He obtained a LUE Duplex given his hx of DVT that was unremarkable.     Important Medication Changes and Reason:  - Naltrexone - Alcohol Use Disorder    Active or Pending Issues Requiring Follow-up:  - PCP     Advanced Directives:   [X] Full code  [ ] DNR  [ ] Hospice    Discharge Diagnoses:  - Alcohol withdrawal

## 2024-05-09 NOTE — ED PROVIDER NOTE - OBJECTIVE STATEMENT
44yo M with PMH of EtOH dependence hx of DT/, anxiety, hx of DVT in past presents to ED for eval for detox. Was in a [place] detox in Jan, relapsed after returning home. Drinks 1L whisky/vodka daily, hx of DT/seizures in past (most recent Jan'24 before detox). Also on lorazepam 0.5 (q24h ordered, but admits to taking it more than that). Last drink this AM. Feels unsteady when walking but otherwise currently minimally symptomatic. No CP, SOB,a bd pain, NV, fever, urinary sxs.

## 2024-05-09 NOTE — ED PROVIDER NOTE - CADM POA PRESS ULCER
· BMP on 8/11 shows K of 3.1  · Hypomagnesimia with Mg of 1.4  · Most likely due to poor oral intake  · Subjectively, the patient feels fine and at baseline    Plan:  · Potassium chloride 40mEq IVPB  · Magnesium sulfate 2 g IVPB once  · Recheck magnesium in a.m. draw  · Monitor BMP  q12hr No

## 2024-05-09 NOTE — DISCHARGE NOTE PROVIDER - NSDCCPCAREPLAN_GEN_ALL_CORE_FT
PRINCIPAL DISCHARGE DIAGNOSIS  Diagnosis: At risk for alcohol withdrawal  Assessment and Plan of Treatment: You have a history of high amounts of alcohol use complicated by seizures and hospitalization in the past. You came into the hospital for being at high risk of withdrawal after stopping alcohol. We started you on a medication taper to safely wean you off alcohol and prevent complications (seizures, intubations, ICU admission). You completed this taper without incident and at this time is safe for discharge. You are still at high risk of withdrawal complications in the future if you resume alcohol use. It is important to follow up with your PCP and psychiatry about ways to help reduce alcohol cravings. To this end, we are discharging you naltrexone, a medication that has been shown to help reduce these cravings.     PRINCIPAL DISCHARGE DIAGNOSIS  Diagnosis: At risk for alcohol withdrawal  Assessment and Plan of Treatment: You have a history of high amounts of alcohol use complicated by seizures and hospitalization in the past. You came into the hospital for being at high risk of withdrawal after stopping alcohol. We started you on a medication taper to safely wean you off alcohol and prevent complications (seizures, intubations, ICU admission). You completed this taper without incident and at this time is safe for discharge. You are still at high risk of withdrawal complications in the future if you resume alcohol use. It is important to follow up with your PCP and psychiatry about ways to help reduce alcohol cravings.     PRINCIPAL DISCHARGE DIAGNOSIS  Diagnosis: At risk for alcohol withdrawal  Assessment and Plan of Treatment: You have a history of high amounts of alcohol use complicated by seizures and hospitalization in the past. You came into the hospital for being at high risk of withdrawal after stopping alcohol. We started you on a medication taper to safely wean you off alcohol and prevent complications (seizures, intubations, ICU admission). You completed this taper without incident and at this time is safe for discharge. You are still at high risk of withdrawal complications in the future if you resume alcohol use. It is important to follow up with your PCP and psychiatry about ways to help reduce alcohol cravings. You will be discharged on a medication to help with cravings, naltrexone. If you experience fevers, chills, nausea, vomiting, significantly fast heart rate, sweating, tremors, these may be signs you are in alcohol withdrawal again. Immediately return to the hospital for further care

## 2024-05-09 NOTE — DISCHARGE NOTE PROVIDER - PROVIDER TOKENS
PROVIDER:[TOKEN:[[id number]],FOLLOWUP:[2 weeks]] PROVIDER:[TOKEN:[[id number]],FOLLOWUP:[2 weeks],ESTABLISHEDPATIENT:[T]]

## 2024-05-09 NOTE — ED PROVIDER NOTE - PHYSICAL EXAMINATION
CONSTITUTIONAL: anxious appearing M in no acute distress   SKIN: Warm dry  HEAD: NCAT  EYES: NL inspection  ENT: MMM  NECK: Supple  CARD: RRR  RESP: CTAB  ABD: S/NT no R/G  EXT: minimal finger tip tremor  NEURO: Grossly unremarkable  PSYCH: Cooperative, appropriate.

## 2024-05-09 NOTE — ED PROVIDER NOTE - CLINICAL SUMMARY MEDICAL DECISION MAKING FREE TEXT BOX
Nilesh PGY3: 42yo M with PMH of EtOH dependence hx of DT/, anxiety, hx of DVT in past presents to ED for eval for detox. sbirt eval. high risk for wd sxs given hx. Not currently displaying sxs of acute wd. dispo with sbirt  vs admit for risk for wd

## 2024-05-09 NOTE — H&P ADULT - ATTENDING COMMENTS
In brief, 43yoM PMHx of EtOH dependence w/ hx of DT/seizures (recent 1/2024), anxiety, DVT (7/2023), presenting with tremors and concern for  ETOh withdrawal.   - CIWA   - Lower leg dopplers

## 2024-05-09 NOTE — H&P ADULT - NSHPPHYSICALEXAM_GEN_ALL_CORE
VITALS:   T(C): 36.7 (05-09-24 @ 10:58), Max: 36.7 (05-09-24 @ 10:58)  HR: 63 (05-09-24 @ 10:58) (63 - 63)  BP: 142/76 (05-09-24 @ 10:58) (142/76 - 142/76)  RR: 18 (05-09-24 @ 10:58) (18 - 18)  SpO2: 97% (05-09-24 @ 10:58) (97% - 97%)    GENERAL: NAD  HEAD:  Atraumatic, Normocephalic  EYES: EOMI, PERRLA, conjunctiva and sclera clear  ENT: Moist mucous membranes. No tongue fasciculations  NECK: Supple, No elevated JVP.  CHEST/LUNG: Clear to auscultation bilaterally; No rales, rhonchi, or wheezes. Erythema on neck.    HEART: Regular rate and rhythm  ABDOMEN: Soft, nontender, nondistended  EXTREMITIES:  2+ Peripheral Pulses  NERVOUS SYSTEM:  A&Ox3, no focal deficits. No shaking with outstretched arms  SKIN: Tattoo on the upper back VITALS:   T(C): 36.7 (05-09-24 @ 10:58), Max: 36.7 (05-09-24 @ 10:58)  HR: 63 (05-09-24 @ 10:58) (63 - 63)  BP: 142/76 (05-09-24 @ 10:58) (142/76 - 142/76)  RR: 18 (05-09-24 @ 10:58) (18 - 18)  SpO2: 97% (05-09-24 @ 10:58) (97% - 97%)    GENERAL: NAD  HEAD:  Atraumatic, Normocephalic  EYES: EOMI, PERRLA, conjunctiva and sclera clear  ENT: Moist mucous membranes. No tongue fasciculations  NECK: Supple, No elevated JVP.  CHEST/LUNG: Clear to auscultation bilaterally; No rales, rhonchi, or wheezes. Blanching non-tender erythema on neck.    HEART: Regular rate and rhythm  ABDOMEN: Soft, nontender, nondistended  EXTREMITIES:  2+ Peripheral Pulses  NERVOUS SYSTEM:  A&Ox3, no focal deficits. No shaking with outstretched arms  SKIN: Tattoo on the upper back

## 2024-05-09 NOTE — H&P ADULT - PROBLEM SELECTOR PLAN 4
Diet: Regular  DVT: Lovenox 40mg  Dispo: Pending Diet: Regular  DVT: Ambulate As Tolerated  Dispo: Home

## 2024-05-09 NOTE — H&P ADULT - PROBLEM SELECTOR PLAN 1
Hx of alcohol abuse with hospitalizations and seizures. Most recently 1/2024  Drinks 1L whiskey/vodka daily  Last known drink 5/9 AM    Plan   - High Risk CIWA Taper w/ Lorazepam  - Symptom triggered CIWA w/ Lorazepam  - SBIRT consulted in the ED  - Folate  - Thiamine Hx of alcohol abuse with hospitalizations and seizures. Most recently 1/2024  Drinks 1L whiskey/vodka daily  Last known drink 5/9 AM    Plan   - High Risk CIWA Taper w/ Lorazepam  - Symptom triggered CIWA w/ Lorazepam  - SBIRT consulted in the ED  - Folate  - Thiamine    Outpatient  - Can benefit from naltrexone outpatient Hx of alcohol abuse with hospitalizations and seizures. Most recently 1/2024  Drinks 1L whiskey/vodka daily  Last known drink 5/9 AM    Plan   - High Risk CIWA Taper w/ Lorazepam  - Symptom triggered CIWA w/ Lorazepam  - SBIRT consulted in the ED  - Folate  - Thiamine    Outpatient  - Can benefit from naltrexone outpatient  - Consider RUQ US outpatient Hx of alcohol abuse with hospitalizations/seizures/DT. Most recently 1/2024  Prior history of attempting to quit alcohol - tho has not been successful  Drinks 1L whiskey/vodka daily  Last known drink 5/9 AM    Plan   - High Risk CIWA Taper w/ Lorazepam  - Symptom triggered CIWA w/ Lorazepam  - SBIRT consulted in the ED  - Folate  - Thiamine    Outpatient  - Can benefit from naltrexone outpatient  - Consider RUQ US outpatient

## 2024-05-09 NOTE — H&P ADULT - ASSESSMENT
43yoM PMHx of EtOH dependence w/ hx of DT/seizures in the past (recent 1/2024), anxiety, DVT (7/2023 - on Eliquis), p/w eval for detox 43yoM PMHx of EtOH dependence w/ hx of DT/seizures in the past (recent 1/2024), anxiety, DVT (7/2023 - on Eliquis), p/w alcohol use 43yoM PMHx of EtOH dependence w/ hx of DT/seizures (recent 1/2024), anxiety, DVT (7/2023), p/w alcohol use 43yoM PMHx of EtOH dependence w/ hx of DT/seizures (recent 1/2024), anxiety, DVT (7/2023), p/w alcohol use c/f withdrawal

## 2024-05-09 NOTE — ED ADULT NURSE REASSESSMENT NOTE - NS ED NURSE REASSESS COMMENT FT1
Break RN: Pt received in stretcher in 3A. Pt resting comfortably. pt offered no complains at present. respiration even and non-labored. in NAD. NSR on monitor. CIWA as noted. Medicated as per order. Report given to ESSU 2 RN

## 2024-05-10 PROCEDURE — 99233 SBSQ HOSP IP/OBS HIGH 50: CPT | Mod: GC

## 2024-05-10 RX ORDER — LANOLIN ALCOHOL/MO/W.PET/CERES
6 CREAM (GRAM) TOPICAL ONCE
Refills: 0 | Status: COMPLETED | OUTPATIENT
Start: 2024-05-10 | End: 2024-05-10

## 2024-05-10 RX ORDER — LANOLIN ALCOHOL/MO/W.PET/CERES
3 CREAM (GRAM) TOPICAL ONCE
Refills: 0 | Status: COMPLETED | OUTPATIENT
Start: 2024-05-10 | End: 2024-05-10

## 2024-05-10 RX ORDER — NICOTINE POLACRILEX 2 MG
1 GUM BUCCAL DAILY
Refills: 0 | Status: DISCONTINUED | OUTPATIENT
Start: 2024-05-10 | End: 2024-05-12

## 2024-05-10 RX ADMIN — Medication 2 MILLIGRAM(S): at 10:08

## 2024-05-10 RX ADMIN — Medication 3 MILLIGRAM(S): at 00:22

## 2024-05-10 RX ADMIN — Medication 1 TABLET(S): at 17:20

## 2024-05-10 RX ADMIN — Medication 2 MILLIGRAM(S): at 14:35

## 2024-05-10 RX ADMIN — Medication 2 MILLIGRAM(S): at 02:19

## 2024-05-10 RX ADMIN — Medication 2 MILLIGRAM(S): at 06:31

## 2024-05-10 RX ADMIN — Medication 1.5 MILLIGRAM(S): at 18:30

## 2024-05-10 RX ADMIN — Medication 6 MILLIGRAM(S): at 22:25

## 2024-05-10 RX ADMIN — Medication 1 PATCH: at 20:18

## 2024-05-10 RX ADMIN — Medication 1 PATCH: at 18:49

## 2024-05-10 RX ADMIN — Medication 1.5 MILLIGRAM(S): at 22:01

## 2024-05-10 RX ADMIN — ENOXAPARIN SODIUM 40 MILLIGRAM(S): 100 INJECTION SUBCUTANEOUS at 17:20

## 2024-05-10 RX ADMIN — Medication 1 MILLIGRAM(S): at 17:20

## 2024-05-10 RX ADMIN — Medication 100 MILLIGRAM(S): at 17:20

## 2024-05-10 NOTE — PROGRESS NOTE ADULT - PROBLEM SELECTOR PLAN 2
Hx of LE DVT in the Right leg in 2023 and started on Eliquis  No longer taking Eliquis  L>R leg swelling  Hx of Baker cysts for which he takes Meloxicam    Plan  - DVT LE unremarkable

## 2024-05-10 NOTE — PROGRESS NOTE ADULT - SUBJECTIVE AND OBJECTIVE BOX
PROGRESS NOTE:   Authored by Dr. Kike Bach MD (PGY-1)  Patient is a 43y old  Male who presents with a chief complaint of Alcohol Withdrawal (09 May 2024 17:48)    SUBJECTIVE / OVERNIGHT EVENTS:  No acute events overnight.   - CIWA overnight 4 (Tremors). On lorazepam taper. No symptom triggers lorazepam needed    MEDICATIONS  (STANDING):  enoxaparin Injectable 40 milliGRAM(s) SubCutaneous every 24 hours  folic acid 1 milliGRAM(s) Oral daily  LORazepam     Tablet 2 milliGRAM(s) Oral every 4 hours  LORazepam     Tablet 1.5 milliGRAM(s) Oral every 4 hours  LORazepam     Tablet   Oral   multivitamin 1 Tablet(s) Oral daily  thiamine 100 milliGRAM(s) Oral daily    MEDICATIONS  (PRN):  acetaminophen     Tablet .. 650 milliGRAM(s) Oral every 6 hours PRN Temp greater or equal to 38C (100.4F), Mild Pain (1 - 3)  LORazepam     Tablet 2 milliGRAM(s) Oral every 2 hours PRN Symptom-triggered 2 point increase in CIWA-Ar  LORazepam     Tablet 2 milliGRAM(s) Oral every 1 hour PRN Symptom-triggered: each CIWA -Ar score 8 or GREATER   CAPILLARY BLOOD GLUCOSE       I&O's Summary   PHYSICAL EXAM:  Vital Signs Last 24 Hrs  T(C): 36.6 (10 May 2024 05:11), Max: 36.9 (09 May 2024 17:35)  T(F): 97.9 (10 May 2024 05:11), Max: 98.4 (09 May 2024 17:35)  HR: 60 (10 May 2024 05:11) (60 - 98)  BP: 108/75 (10 May 2024 05:11) (108/75 - 142/76)  BP(mean): --  RR: 18 (10 May 2024 05:11) (18 - 20)  SpO2: 97% (10 May 2024 05:11) (97% - 99%)    Parameters below as of 10 May 2024 05:11  Patient On (Oxygen Delivery Method): room air    GENERAL: NAD  HEAD:  Atraumatic, Normocephalic  EYES: EOMI, PERRLA, conjunctiva and sclera clear  ENT: Moist mucous membranes. No tongue fasciculations  NECK: Supple, No elevated JVP.  CHEST/LUNG: Clear to auscultation bilaterally; No rales, rhonchi, or wheezes. Blanching non-tender erythema on neck.    HEART: Regular rate and rhythm  ABDOMEN: Soft, nontender, nondistended  EXTREMITIES:  2+ Peripheral Pulses  NERVOUS SYSTEM:  A&Ox3, no focal deficits. No shaking with outstretched arms  SKIN: Tattoo on the upper back    LABS:                        13.4   4.59  )-----------( 178      ( 09 May 2024 12:15 )             38.0   05-09    139  |  100  |  12  ----------------------------<  140<H>  4.0   |  23  |  0.70    Ca    8.6      09 May 2024 12:15  Mg     2.10     05-09    TPro  7.2  /  Alb  4.7  /  TBili  0.7  /  DBili  x   /  AST  47<H>  /  ALT  37  /  AlkPhos  59  05-09    Urinalysis Basic - ( 09 May 2024 12:15 )    Color: x / Appearance: x / SG: x / pH: x  Gluc: 140 mg/dL / Ketone: x  / Bili: x / Urobili: x   Blood: x / Protein: x / Nitrite: x   Leuk Esterase: x / RBC: x / WBC x   Sq Epi: x / Non Sq Epi: x / Bacteria: x    A1C with Estimated Average Glucose Result: 4.4 % (07-27-23 @ 06:00)    MICROBIOLOGY:    PROGRESS NOTE:   Authored by Dr. Kike Bach MD (PGY-1)  Patient is a 43y old  Male who presents with a chief complaint of Alcohol Withdrawal (09 May 2024 17:48)    SUBJECTIVE / OVERNIGHT EVENTS:  No acute events overnight.   - CIWA overnight 4 (Tremors). On lorazepam taper. No symptom triggers lorazepam needed    MEDICATIONS  (STANDING):  enoxaparin Injectable 40 milliGRAM(s) SubCutaneous every 24 hours  folic acid 1 milliGRAM(s) Oral daily  LORazepam     Tablet 2 milliGRAM(s) Oral every 4 hours  LORazepam     Tablet 1.5 milliGRAM(s) Oral every 4 hours  LORazepam     Tablet   Oral   multivitamin 1 Tablet(s) Oral daily  thiamine 100 milliGRAM(s) Oral daily    MEDICATIONS  (PRN):  acetaminophen     Tablet .. 650 milliGRAM(s) Oral every 6 hours PRN Temp greater or equal to 38C (100.4F), Mild Pain (1 - 3)  LORazepam     Tablet 2 milliGRAM(s) Oral every 2 hours PRN Symptom-triggered 2 point increase in CIWA-Ar  LORazepam     Tablet 2 milliGRAM(s) Oral every 1 hour PRN Symptom-triggered: each CIWA -Ar score 8 or GREATER   CAPILLARY BLOOD GLUCOSE     I&O's Summary   PHYSICAL EXAM:  Vital Signs Last 24 Hrs  T(C): 36.6 (10 May 2024 05:11), Max: 36.9 (09 May 2024 17:35)  T(F): 97.9 (10 May 2024 05:11), Max: 98.4 (09 May 2024 17:35)  HR: 60 (10 May 2024 05:11) (60 - 98)  BP: 108/75 (10 May 2024 05:11) (108/75 - 142/76)  BP(mean): --  RR: 18 (10 May 2024 05:11) (18 - 20)  SpO2: 97% (10 May 2024 05:11) (97% - 99%)    Parameters below as of 10 May 2024 05:11  Patient On (Oxygen Delivery Method): room air    GENERAL: NAD  HEAD:  Atraumatic, Normocephalic  EYES: EOMI, PERRLA, conjunctiva and sclera clear  ENT: Moist mucous membranes. No tongue fasciculations  NECK: Supple, No elevated JVP.  CHEST/LUNG: Clear to auscultation bilaterally; No rales, rhonchi, or wheezes. Blanching non-tender erythema on neck.    HEART: Regular rate and rhythm  ABDOMEN: Soft, nontender, nondistended  EXTREMITIES:  2+ Peripheral Pulses  NERVOUS SYSTEM:  A&Ox3, no focal deficits. No shaking with outstretched arms  SKIN: Tattoo on the upper back    LABS:                        13.4   4.59  )-----------( 178      ( 09 May 2024 12:15 )             38.0   05-09    139  |  100  |  12  ----------------------------<  140<H>  4.0   |  23  |  0.70    Ca    8.6      09 May 2024 12:15  Mg     2.10     05-09    TPro  7.2  /  Alb  4.7  /  TBili  0.7  /  DBili  x   /  AST  47<H>  /  ALT  37  /  AlkPhos  59  05-09    Urinalysis Basic - ( 09 May 2024 12:15 )    Color: x / Appearance: x / SG: x / pH: x  Gluc: 140 mg/dL / Ketone: x  / Bili: x / Urobili: x   Blood: x / Protein: x / Nitrite: x   Leuk Esterase: x / RBC: x / WBC x   Sq Epi: x / Non Sq Epi: x / Bacteria: x    A1C with Estimated Average Glucose Result: 4.4 % (07-27-23 @ 06:00)    MICROBIOLOGY:

## 2024-05-10 NOTE — PROGRESS NOTE ADULT - ASSESSMENT
43yoM PMHx of EtOH dependence w/ hx of DT/seizures (recent 1/2024), anxiety, DVT (7/2023), p/w alcohol use c/f withdrawal

## 2024-05-10 NOTE — PROGRESS NOTE ADULT - TIME BILLING
Review of laboratory data, radiology results, consultants' recommendations, documentation in [place], discussion with patient/house staff/ACP and interdisciplinary staff (such as , social workers, etc). Interventions were performed as documented above.

## 2024-05-11 LAB
ANION GAP SERPL CALC-SCNC: 15 MMOL/L — HIGH (ref 7–14)
BUN SERPL-MCNC: 12 MG/DL — SIGNIFICANT CHANGE UP (ref 7–23)
CALCIUM SERPL-MCNC: 8.7 MG/DL — SIGNIFICANT CHANGE UP (ref 8.4–10.5)
CHLORIDE SERPL-SCNC: 103 MMOL/L — SIGNIFICANT CHANGE UP (ref 98–107)
CO2 SERPL-SCNC: 22 MMOL/L — SIGNIFICANT CHANGE UP (ref 22–31)
CREAT SERPL-MCNC: 0.54 MG/DL — SIGNIFICANT CHANGE UP (ref 0.5–1.3)
EGFR: 125 ML/MIN/1.73M2 — SIGNIFICANT CHANGE UP
GLUCOSE SERPL-MCNC: 91 MG/DL — SIGNIFICANT CHANGE UP (ref 70–99)
MAGNESIUM SERPL-MCNC: 2 MG/DL — SIGNIFICANT CHANGE UP (ref 1.6–2.6)
PHOSPHATE SERPL-MCNC: 4.2 MG/DL — SIGNIFICANT CHANGE UP (ref 2.5–4.5)
POTASSIUM SERPL-MCNC: 3.7 MMOL/L — SIGNIFICANT CHANGE UP (ref 3.5–5.3)
POTASSIUM SERPL-SCNC: 3.7 MMOL/L — SIGNIFICANT CHANGE UP (ref 3.5–5.3)
SODIUM SERPL-SCNC: 140 MMOL/L — SIGNIFICANT CHANGE UP (ref 135–145)

## 2024-05-11 PROCEDURE — 99233 SBSQ HOSP IP/OBS HIGH 50: CPT | Mod: GC

## 2024-05-11 RX ADMIN — Medication 1.5 MILLIGRAM(S): at 10:31

## 2024-05-11 RX ADMIN — Medication 1 PATCH: at 06:43

## 2024-05-11 RX ADMIN — Medication 1 PATCH: at 11:25

## 2024-05-11 RX ADMIN — Medication 1.5 MILLIGRAM(S): at 02:04

## 2024-05-11 RX ADMIN — Medication 1 MILLIGRAM(S): at 11:25

## 2024-05-11 RX ADMIN — ENOXAPARIN SODIUM 40 MILLIGRAM(S): 100 INJECTION SUBCUTANEOUS at 17:10

## 2024-05-11 RX ADMIN — Medication 100 MILLIGRAM(S): at 11:25

## 2024-05-11 RX ADMIN — Medication 1.5 MILLIGRAM(S): at 06:13

## 2024-05-11 RX ADMIN — Medication 1 TABLET(S): at 11:25

## 2024-05-11 NOTE — PATIENT PROFILE ADULT - FUNCTIONAL ASSESSMENT - DAILY ACTIVITY 3.
ANTICOAGULATION FOLLOW-UP CLINIC VISIT    Patient Name:  Edwin Nuno  Date:  2/5/2018  Contact Type:  Face to Face    SUBJECTIVE:     Patient Findings     Positives Intentional hold of therapy (2/1/18)    Comments Leg still swollen and hematoma still there.            OBJECTIVE    INR Protime   Date Value Ref Range Status   02/05/2018 3.8 (A) 0.86 - 1.14 Final       ASSESSMENT / PLAN  INR assessment SUPRA hold x1 dose. Reduce from 57.5mg to 47.5mg/7 days (17%)   Recheck INR In: 3 DAYS    INR Location Clinic      Anticoagulation Summary as of 2/5/2018     INR goal 2.0-3.0   Today's INR 3.8!   Maintenance plan 10 mg (5 mg x 2) every day   Full instructions 2/5: Hold; Otherwise 10 mg every day   Weekly total 70 mg   Plan last modified Maria Teresa Meredith RN (1/25/2018)   Next INR check 2/8/2018   Priority INR   Target end date     Indications   DVT (deep venous thrombosis) (H) [I82.409]  Pulmonary embolism on right (H) [I26.99]  Long-term (current) use of anticoagulants [Z79.01] [Z79.01]         Anticoagulation Episode Summary     INR check location     Preferred lab     Send INR reminders to [facility] POOL    Comments * Provoked DVTs from knee surgery that lead to PE. Length of therapy is 3-6 months but will discuss with PCP on 1-19-18      Anticoagulation Care Providers     Provider Role Specialty Phone number    Cecilio Hughes MD [facility] [phone]            See the Encounter Report to view Anticoagulation Flowsheet and Dosing Calendar (Go to Encounters tab in chart review, and find the Anticoagulation Therapy Visit)      Rohini Cisneros RN               
4 = No assist / stand by assistance

## 2024-05-11 NOTE — PATIENT PROFILE ADULT - NSPROMEDSBROUGHTTOHOSP_GEN_A_NUR
no
No. DANNY screening performed.  STOP BANG Legend: 0-2 = LOW Risk; 3-4 = INTERMEDIATE Risk; 5-8 = HIGH Risk

## 2024-05-11 NOTE — CHART NOTE - NSCHARTNOTEFT_GEN_A_CORE
Documented hx of seizure/DT from alcohol withdrawal this Jan 2024.     Clarified with patient. He had stopped drinking with plans to check himself into a detox center in [place]. Around 24hours after he stopped drinking, at 4:11 he was brushing his teeth about to leave for the plane at 5:30 when he tensed up, per GF, and lost consciousness. He woke up on the floor after a few seconds. Denied post-ictal confusion, soiling himself, or convulsions noticed by his GF. He did not seek medical care for this episode and still made the flight to [place] where he notified staff of this event. Documented hx of seizure/DT from alcohol withdrawal this Jan 2024.     Clarified with patient. He had stopped drinking with plans to check himself into a detox center in [place]. Around 24hours after he stopped drinking, at 4:11 he was brushing his teeth about to leave for the plane at 5:30 when he tensed up, per GF, and lost consciousness. He woke up on the floor after a few seconds. Denied post-ictal confusion, soiling himself, or convulsions noticed by his GF. Otherwise tremors, diaphoresis, vital sign instability. He did not seek medical care for this episode and still made the flight to [place] where he notified staff of this event.

## 2024-05-11 NOTE — CHART NOTE - NSCHARTNOTEFT_GEN_A_CORE
PDI	Current Rx	Drug Type	Rx Written	Rx Dispensed	Drug	Quantity	Days Supply	Prescriber Name	Prescriber CHRISTINE #	Payment Method	Dispenser  A	Y	B	04/16/2024	04/18/2024	lorazepam 0.5 mg tablet	30	30	Georgia Pinto)	[id number]	[facility] #[id number]  A	N	B	02/21/2024	02/22/2024	lorazepam 0.5 mg tablet	30	30	Georgia Pinto)	[id number]	[facility] #[id number]

## 2024-05-11 NOTE — PROGRESS NOTE ADULT - PROBLEM SELECTOR PLAN 2
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

## 2024-05-11 NOTE — PATIENT PROFILE ADULT - FALL HARM RISK - RISK INTERVENTIONS
Assistance OOB with selected safe patient handling equipment/Assistance with ambulation/Communicate Fall Risk and Risk Factors to all staff, patient, and family/Monitor for mental status changes/Monitor gait and stability/Reinforce activity limits and safety measures with patient and family/Toileting schedule using arm’s reach rule for commode and bathroom/Use of alarms - bed, chair and/or voice tab/Visual Cue: Yellow wristband/Bed in lowest position, wheels locked, appropriate side rails in place/Call bell, personal items and telephone in reach/Instruct patient to call for assistance before getting out of bed or chair/Non-slip footwear when patient is out of bed/[place] to call system/Physically safe environment - no spills, clutter or unnecessary equipment/Purposeful Proactive Rounding/Room/bathroom lighting operational, light cord in reach

## 2024-05-11 NOTE — PROGRESS NOTE ADULT - SUBJECTIVE AND OBJECTIVE BOX
PROGRESS NOTE:   Authored by Dr. Kike Bach MD (PGY-1)  Patient is a 43y old  Male who presents with a chief complaint of Alcohol Withdrawal (10 May 2024 07:14)      SUBJECTIVE / OVERNIGHT EVENTS:  No acute events overnight.     MEDICATIONS  (STANDING):  enoxaparin Injectable 40 milliGRAM(s) SubCutaneous every 24 hours  folic acid 1 milliGRAM(s) Oral daily  LORazepam     Tablet 1.5 milliGRAM(s) Oral every 4 hours  LORazepam     Tablet   Oral   LORazepam     Tablet 1 milliGRAM(s) Oral every 4 hours  multivitamin 1 Tablet(s) Oral daily  nicotine -   7 mG/24Hr(s) Patch 1 Patch Transdermal daily  thiamine 100 milliGRAM(s) Oral daily    MEDICATIONS  (PRN):  acetaminophen     Tablet .. 650 milliGRAM(s) Oral every 6 hours PRN Temp greater or equal to 38C (100.4F), Mild Pain (1 - 3)  LORazepam     Tablet 2 milliGRAM(s) Oral every 2 hours PRN Symptom-triggered 2 point increase in CIWA-Ar  LORazepam     Tablet 2 milliGRAM(s) Oral every 1 hour PRN Symptom-triggered: each CIWA -Ar score 8 or GREATER   CAPILLARY BLOOD GLUCOSE       I&O's Summary   PHYSICAL EXAM:  Vital Signs Last 24 Hrs  T(C): 36.1 (11 May 2024 06:01), Max: 36.8 (10 May 2024 12:00)  T(F): 97 (11 May 2024 06:01), Max: 98.2 (10 May 2024 12:00)  HR: 66 (11 May 2024 06:01) (60 - 79)  BP: 118/86 (11 May 2024 06:01) (114/76 - 137/83)  BP(mean): --  RR: 18 (11 May 2024 06:01) (16 - 19)  SpO2: 98% (11 May 2024 06:01) (98% - 100%)    Parameters below as of 11 May 2024 06:01  Patient On (Oxygen Delivery Method): room air    GENERAL: NAD  HEAD:  Atraumatic, Normocephalic  EYES: EOMI, PERRLA, conjunctiva and sclera clear  ENT: Moist mucous membranes. No tongue fasciculations  NECK: Supple, No elevated JVP.  CHEST/LUNG: Clear to auscultation bilaterally; No rales, rhonchi, or wheezes. Blanching non-tender erythema on neck.    HEART: Regular rate and rhythm  ABDOMEN: Soft, nontender, nondistended  EXTREMITIES:  2+ Peripheral Pulses  NERVOUS SYSTEM:  A&Ox3, no focal deficits. No shaking with outstretched arms  SKIN: Tattoo on the upper back    LABS:                        13.4   4.59  )-----------( 178      ( 09 May 2024 12:15 )             38.0   05-09    139  |  100  |  12  ----------------------------<  140<H>  4.0   |  23  |  0.70    Ca    8.6      09 May 2024 12:15  Mg     2.10     05-09    TPro  7.2  /  Alb  4.7  /  TBili  0.7  /  DBili  x   /  AST  47<H>  /  ALT  37  /  AlkPhos  59  05-09    Urinalysis Basic - ( 09 May 2024 12:15 )    Color: x / Appearance: x / SG: x / pH: x  Gluc: 140 mg/dL / Ketone: x  / Bili: x / Urobili: x   Blood: x / Protein: x / Nitrite: x   Leuk Esterase: x / RBC: x / WBC x   Sq Epi: x / Non Sq Epi: x / Bacteria: x    A1C with Estimated Average Glucose Result: 4.4 % (07-27-23 @ 06:00)    MICROBIOLOGY:    PROGRESS NOTE:   Authored by Dr. Kike Bach MD (PGY-1)  Patient is a 43y old  Male who presents with a chief complaint of Alcohol Withdrawal (10 May 2024 07:14)    SUBJECTIVE / OVERNIGHT EVENTS:  No acute events overnight.   - CIWAs ON mostly 1 (tremor). Did not require PRN lorazepam. VSS    MEDICATIONS  (STANDING):  enoxaparin Injectable 40 milliGRAM(s) SubCutaneous every 24 hours  folic acid 1 milliGRAM(s) Oral daily  LORazepam     Tablet 1.5 milliGRAM(s) Oral every 4 hours  LORazepam     Tablet   Oral   LORazepam     Tablet 1 milliGRAM(s) Oral every 4 hours  multivitamin 1 Tablet(s) Oral daily  nicotine -   7 mG/24Hr(s) Patch 1 Patch Transdermal daily  thiamine 100 milliGRAM(s) Oral daily    MEDICATIONS  (PRN):  acetaminophen     Tablet .. 650 milliGRAM(s) Oral every 6 hours PRN Temp greater or equal to 38C (100.4F), Mild Pain (1 - 3)  LORazepam     Tablet 2 milliGRAM(s) Oral every 2 hours PRN Symptom-triggered 2 point increase in CIWA-Ar  LORazepam     Tablet 2 milliGRAM(s) Oral every 1 hour PRN Symptom-triggered: each CIWA -Ar score 8 or GREATER   CAPILLARY BLOOD GLUCOSE       I&O's Summary   PHYSICAL EXAM:  Vital Signs Last 24 Hrs  T(C): 36.1 (11 May 2024 06:01), Max: 36.8 (10 May 2024 12:00)  T(F): 97 (11 May 2024 06:01), Max: 98.2 (10 May 2024 12:00)  HR: 66 (11 May 2024 06:01) (60 - 79)  BP: 118/86 (11 May 2024 06:01) (114/76 - 137/83)  BP(mean): --  RR: 18 (11 May 2024 06:01) (16 - 19)  SpO2: 98% (11 May 2024 06:01) (98% - 100%)    Parameters below as of 11 May 2024 06:01  Patient On (Oxygen Delivery Method): room air    GENERAL: NAD  HEAD:  Atraumatic, Normocephalic  EYES: EOMI, PERRLA, conjunctiva and sclera clear  ENT: Moist mucous membranes. No tongue fasciculations  NECK: Supple, No elevated JVP.  CHEST/LUNG: Clear to auscultation bilaterally; No rales, rhonchi, or wheezes. Blanching non-tender erythema on neck.    HEART: Regular rate and rhythm  ABDOMEN: Soft, nontender, nondistended  EXTREMITIES:  2+ Peripheral Pulses  NERVOUS SYSTEM:  A&Ox3, no focal deficits. No shaking with outstretched arms  SKIN: Tattoo on the upper back    LABS:                        13.4   4.59  )-----------( 178      ( 09 May 2024 12:15 )             38.0   05-09    139  |  100  |  12  ----------------------------<  140<H>  4.0   |  23  |  0.70    Ca    8.6      09 May 2024 12:15  Mg     2.10     05-09    TPro  7.2  /  Alb  4.7  /  TBili  0.7  /  DBili  x   /  AST  47<H>  /  ALT  37  /  AlkPhos  59  05-09    Urinalysis Basic - ( 09 May 2024 12:15 )    Color: x / Appearance: x / SG: x / pH: x  Gluc: 140 mg/dL / Ketone: x  / Bili: x / Urobili: x   Blood: x / Protein: x / Nitrite: x   Leuk Esterase: x / RBC: x / WBC x   Sq Epi: x / Non Sq Epi: x / Bacteria: x    A1C with Estimated Average Glucose Result: 4.4 % (07-27-23 @ 06:00)    MICROBIOLOGY:    PROGRESS NOTE:   Authored by Dr. Kike Bach MD (PGY-1)  Patient is a 43y old  Male who presents with a chief complaint of Alcohol Withdrawal (10 May 2024 07:14)    SUBJECTIVE / OVERNIGHT EVENTS:  No acute events overnight.   - CIWAs ON mostly 1 (tremor). Did not require PRN lorazepam. VSS  - No acute complaints in the AM. Would like to be discharged soon. Partner had concerns he had some cognitive deficits (misspoken about a phone number, raised the bed when trying to lower it).    MEDICATIONS  (STANDING):  enoxaparin Injectable 40 milliGRAM(s) SubCutaneous every 24 hours  folic acid 1 milliGRAM(s) Oral daily  LORazepam     Tablet 1.5 milliGRAM(s) Oral every 4 hours  LORazepam     Tablet   Oral   LORazepam     Tablet 1 milliGRAM(s) Oral every 4 hours  multivitamin 1 Tablet(s) Oral daily  nicotine -   7 mG/24Hr(s) Patch 1 Patch Transdermal daily  thiamine 100 milliGRAM(s) Oral daily    MEDICATIONS  (PRN):  acetaminophen     Tablet .. 650 milliGRAM(s) Oral every 6 hours PRN Temp greater or equal to 38C (100.4F), Mild Pain (1 - 3)  LORazepam     Tablet 2 milliGRAM(s) Oral every 2 hours PRN Symptom-triggered 2 point increase in CIWA-Ar  LORazepam     Tablet 2 milliGRAM(s) Oral every 1 hour PRN Symptom-triggered: each CIWA -Ar score 8 or GREATER   CAPILLARY BLOOD GLUCOSE     I&O's Summary   PHYSICAL EXAM:  Vital Signs Last 24 Hrs  T(C): 36.1 (11 May 2024 06:01), Max: 36.8 (10 May 2024 12:00)  T(F): 97 (11 May 2024 06:01), Max: 98.2 (10 May 2024 12:00)  HR: 66 (11 May 2024 06:01) (60 - 79)  BP: 118/86 (11 May 2024 06:01) (114/76 - 137/83)  BP(mean): --  RR: 18 (11 May 2024 06:01) (16 - 19)  SpO2: 98% (11 May 2024 06:01) (98% - 100%)    Parameters below as of 11 May 2024 06:01  Patient On (Oxygen Delivery Method): room air    GENERAL: NAD  HEAD:  Atraumatic, Normocephalic  EYES: EOMI, PERRLA, conjunctiva and sclera clear  ENT: Moist mucous membranes. No tongue fasciculations  NECK: Supple, No elevated JVP.  CHEST/LUNG: Clear to auscultation bilaterally; No rales, rhonchi, or wheezes. Blanching non-tender erythema on neck.    HEART: Regular rate and rhythm  ABDOMEN: Soft, nontender, nondistended  EXTREMITIES:  2+ Peripheral Pulses  NERVOUS SYSTEM:  A&Ox3, no focal deficits. No shaking with outstretched arms  SKIN: Tattoo on the upper back    LABS:                        13.4   4.59  )-----------( 178      ( 09 May 2024 12:15 )             38.0   05-09    139  |  100  |  12  ----------------------------<  140<H>  4.0   |  23  |  0.70    Ca    8.6      09 May 2024 12:15  Mg     2.10     05-09    TPro  7.2  /  Alb  4.7  /  TBili  0.7  /  DBili  x   /  AST  47<H>  /  ALT  37  /  AlkPhos  59  05-09    Urinalysis Basic - ( 09 May 2024 12:15 )    Color: x / Appearance: x / SG: x / pH: x  Gluc: 140 mg/dL / Ketone: x  / Bili: x / Urobili: x   Blood: x / Protein: x / Nitrite: x   Leuk Esterase: x / RBC: x / WBC x   Sq Epi: x / Non Sq Epi: x / Bacteria: x    A1C with Estimated Average Glucose Result: 4.4 % (07-27-23 @ 06:00)    MICROBIOLOGY:

## 2024-05-12 ENCOUNTER — TRANSCRIPTION ENCOUNTER (OUTPATIENT)
Age: 44
End: 2024-05-12

## 2024-05-12 VITALS
DIASTOLIC BLOOD PRESSURE: 75 MMHG | SYSTOLIC BLOOD PRESSURE: 116 MMHG | HEART RATE: 81 BPM | RESPIRATION RATE: 16 BRPM | OXYGEN SATURATION: 99 % | TEMPERATURE: 98 F

## 2024-05-12 LAB
ALBUMIN SERPL ELPH-MCNC: 4.9 G/DL — SIGNIFICANT CHANGE UP (ref 3.3–5)
ALP SERPL-CCNC: 60 U/L — SIGNIFICANT CHANGE UP (ref 40–120)
ALT FLD-CCNC: 39 U/L — SIGNIFICANT CHANGE UP (ref 4–41)
ANION GAP SERPL CALC-SCNC: 14 MMOL/L — SIGNIFICANT CHANGE UP (ref 7–14)
AST SERPL-CCNC: 46 U/L — HIGH (ref 4–40)
BASOPHILS # BLD AUTO: 0.03 K/UL — SIGNIFICANT CHANGE UP (ref 0–0.2)
BASOPHILS NFR BLD AUTO: 0.5 % — SIGNIFICANT CHANGE UP (ref 0–2)
BILIRUB SERPL-MCNC: 1.2 MG/DL — SIGNIFICANT CHANGE UP (ref 0.2–1.2)
BUN SERPL-MCNC: 12 MG/DL — SIGNIFICANT CHANGE UP (ref 7–23)
CALCIUM SERPL-MCNC: 9.2 MG/DL — SIGNIFICANT CHANGE UP (ref 8.4–10.5)
CHLORIDE SERPL-SCNC: 102 MMOL/L — SIGNIFICANT CHANGE UP (ref 98–107)
CO2 SERPL-SCNC: 23 MMOL/L — SIGNIFICANT CHANGE UP (ref 22–31)
CREAT SERPL-MCNC: 0.61 MG/DL — SIGNIFICANT CHANGE UP (ref 0.5–1.3)
EGFR: 121 ML/MIN/1.73M2 — SIGNIFICANT CHANGE UP
EOSINOPHIL # BLD AUTO: 0.15 K/UL — SIGNIFICANT CHANGE UP (ref 0–0.5)
EOSINOPHIL NFR BLD AUTO: 2.5 % — SIGNIFICANT CHANGE UP (ref 0–6)
GLUCOSE SERPL-MCNC: 82 MG/DL — SIGNIFICANT CHANGE UP (ref 70–99)
HCT VFR BLD CALC: 41.8 % — SIGNIFICANT CHANGE UP (ref 39–50)
HGB BLD-MCNC: 14.5 G/DL — SIGNIFICANT CHANGE UP (ref 13–17)
IANC: 2.94 K/UL — SIGNIFICANT CHANGE UP (ref 1.8–7.4)
IMM GRANULOCYTES NFR BLD AUTO: 0.3 % — SIGNIFICANT CHANGE UP (ref 0–0.9)
LYMPHOCYTES # BLD AUTO: 2.15 K/UL — SIGNIFICANT CHANGE UP (ref 1–3.3)
LYMPHOCYTES # BLD AUTO: 36.5 % — SIGNIFICANT CHANGE UP (ref 13–44)
MAGNESIUM SERPL-MCNC: 2 MG/DL — SIGNIFICANT CHANGE UP (ref 1.6–2.6)
MCHC RBC-ENTMCNC: 33.1 PG — SIGNIFICANT CHANGE UP (ref 27–34)
MCHC RBC-ENTMCNC: 34.7 GM/DL — SIGNIFICANT CHANGE UP (ref 32–36)
MCV RBC AUTO: 95.4 FL — SIGNIFICANT CHANGE UP (ref 80–100)
MONOCYTES # BLD AUTO: 0.6 K/UL — SIGNIFICANT CHANGE UP (ref 0–0.9)
MONOCYTES NFR BLD AUTO: 10.2 % — SIGNIFICANT CHANGE UP (ref 2–14)
NEUTROPHILS # BLD AUTO: 2.94 K/UL — SIGNIFICANT CHANGE UP (ref 1.8–7.4)
NEUTROPHILS NFR BLD AUTO: 50 % — SIGNIFICANT CHANGE UP (ref 43–77)
NRBC # BLD: 0 /100 WBCS — SIGNIFICANT CHANGE UP (ref 0–0)
NRBC # FLD: 0 K/UL — SIGNIFICANT CHANGE UP (ref 0–0)
PHOSPHATE SERPL-MCNC: 4.1 MG/DL — SIGNIFICANT CHANGE UP (ref 2.5–4.5)
PLATELET # BLD AUTO: 161 K/UL — SIGNIFICANT CHANGE UP (ref 150–400)
POTASSIUM SERPL-MCNC: 3.7 MMOL/L — SIGNIFICANT CHANGE UP (ref 3.5–5.3)
POTASSIUM SERPL-SCNC: 3.7 MMOL/L — SIGNIFICANT CHANGE UP (ref 3.5–5.3)
PROT SERPL-MCNC: 7.4 G/DL — SIGNIFICANT CHANGE UP (ref 6–8.3)
RBC # BLD: 4.38 M/UL — SIGNIFICANT CHANGE UP (ref 4.2–5.8)
RBC # FLD: 12.3 % — SIGNIFICANT CHANGE UP (ref 10.3–14.5)
SODIUM SERPL-SCNC: 139 MMOL/L — SIGNIFICANT CHANGE UP (ref 135–145)
WBC # BLD: 5.89 K/UL — SIGNIFICANT CHANGE UP (ref 3.8–10.5)
WBC # FLD AUTO: 5.89 K/UL — SIGNIFICANT CHANGE UP (ref 3.8–10.5)

## 2024-05-12 PROCEDURE — 99239 HOSP IP/OBS DSCHRG MGMT >30: CPT | Mod: GC

## 2024-05-12 RX ORDER — NALTREXONE HYDROCHLORIDE 50 MG/1
1 TABLET, FILM COATED ORAL
Qty: 30 | Refills: 1
Start: 2024-05-12 | End: 2024-07-10

## 2024-05-12 RX ADMIN — Medication 1 PATCH: at 12:16

## 2024-05-12 RX ADMIN — Medication 1 MILLIGRAM(S): at 12:16

## 2024-05-12 RX ADMIN — Medication 1 TABLET(S): at 12:16

## 2024-05-12 RX ADMIN — Medication 100 MILLIGRAM(S): at 12:16

## 2024-05-12 NOTE — PROGRESS NOTE ADULT - PROBLEM SELECTOR PLAN 1
Hx of alcohol abuse with hospitalizations/seizures/DT. Most recently 1/2024  Prior history of attempting to quit alcohol - tho has not been successful  Drinks 1L whiskey/vodka daily  Last known drink 5/9 AM    Plan   - High Risk CIWA Taper w/ Lorazepam  - Symptom triggered CIWA w/ Lorazepam  - SBIRT consulted in the ED  - Folate  - Thiamine    Outpatient  - Can benefit from naltrexone outpatient  - Consider RUQ US outpatient

## 2024-05-12 NOTE — PROGRESS NOTE ADULT - PROBLEM SELECTOR PLAN 3
On lorazepam 0.5mg. Previously trialed buspirone    Plan  - Hold lorazepam - Will be on lorazepam CIWA

## 2024-05-12 NOTE — DISCHARGE NOTE NURSING/CASE MANAGEMENT/SOCIAL WORK - NSDCPEFALRISK_GEN_ALL_CORE
For information on Fall & Injury Prevention, visit: [email or web address] OR  [email or web address] OR  https://www.cdc.gov/steadi/patient.html

## 2024-05-12 NOTE — DISCHARGE NOTE NURSING/CASE MANAGEMENT/SOCIAL WORK - NSDCPEEMAIL_GEN_ALL_CORE
[facility] for Tobacco Control email [email or web address]

## 2024-05-12 NOTE — DISCHARGE NOTE NURSING/CASE MANAGEMENT/SOCIAL WORK - NSDCPEWEB_GEN_ALL_CORE
[facility] for Tobacco Control website --- [email or web address] website --- [email or web address]

## 2024-05-12 NOTE — PROGRESS NOTE ADULT - SUBJECTIVE AND OBJECTIVE BOX
INCOMPLETE NOTE    Woody Palmer | PGY2| Pager: [place] [phone]) -- [facility] ([phone])  Interval Events:    REVIEW OF SYSTEMS:  CONSTITUTIONAL: No weakness, fevers or chills  EYES/ENT: No visual changes;  No vertigo or throat pain   NECK: No pain or stiffness  RESPIRATORY: No cough, wheezing, hemoptysis; No shortness of breath  CARDIOVASCULAR: No chest pain or palpitations  GASTROINTESTINAL: No abdominal or epigastric pain. No nausea, vomiting, or hematemesis; No diarrhea or constipation. No melena or hematochezia.  GENITOURINARY: No dysuria, frequency or hematuria  NEUROLOGICAL: No numbness or weakness  SKIN: No itching, burning, rashes, or lesions   All other review of systems is negative unless indicated above.    OBJECTIVE:  ICU Vital Signs Last 24 Hrs  T(C): 36.4 (12 May 2024 02:00), Max: 37.1 (11 May 2024 10:00)  T(F): 97.6 (12 May 2024 02:00), Max: 98.7 (11 May 2024 10:00)  HR: 71 (12 May 2024 02:00) (66 - 75)  BP: 114/74 (12 May 2024 02:00) (112/78 - 118/82)  BP(mean): --  ABP: --  ABP(mean): --  RR: 16 (12 May 2024 02:00) (16 - 17)  SpO2: 98% (12 May 2024 02:00) (98% - 100%)    O2 Parameters below as of 12 May 2024 02:00  Patient On (Oxygen Delivery Method): room air              CAPILLARY BLOOD GLUCOSE          PHYSICAL EXAM:  General: WN/WD NAD  Neurology: A&Ox3, nonfocal, GÓMEZ x 4  Eyes: PERRLA/ EOMI, Gross vision intact  ENT/Neck: Neck supple, trachea midline, No JVD, Gross hearing intact  Respiratory: CTA B/L, No wheezing, rales, rhonchi  CV: RRR, +S1/S2, -S3/S4, no murmurs, rubs or gallops  Abdominal: Soft, NT, ND +BS, No HSM  MSK: 5/5 strength UE/LE bilaterally  Extremities: No edema, 2+ peripheral pulses  Skin: No Rashes, Hematoma, Ecchymosis  Incisions:   Tubes:    HOSPITAL MEDICATIONS:  MEDICATIONS  (STANDING):  enoxaparin Injectable 40 milliGRAM(s) SubCutaneous every 24 hours  folic acid 1 milliGRAM(s) Oral daily  multivitamin 1 Tablet(s) Oral daily  nicotine -   7 mG/24Hr(s) Patch 1 Patch Transdermal daily  thiamine 100 milliGRAM(s) Oral daily    MEDICATIONS  (PRN):  acetaminophen     Tablet .. 650 milliGRAM(s) Oral every 6 hours PRN Temp greater or equal to 38C (100.4F), Mild Pain (1 - 3)  LORazepam     Tablet 2 milliGRAM(s) Oral every 2 hours PRN Symptom-triggered 2 point increase in CIWA-Ar  LORazepam     Tablet 2 milliGRAM(s) Oral every 1 hour PRN Symptom-triggered: each CIWA -Ar score 8 or GREATER      LABS:    Hgb Trend: 13.4<--  05-11    140  |  103  |  12  ----------------------------<  91  3.7   |  22  |  0.54    Ca    8.7      11 May 2024 05:58  Phos  4.2     05-11  Mg     2.00     05-11      Creatinine Trend: 0.54<--, 0.70<--    Urinalysis Basic - ( 11 May 2024 05:58 )    Color: x / Appearance: x / SG: x / pH: x  Gluc: 91 mg/dL / Ketone: x  / Bili: x / Urobili: x   Blood: x / Protein: x / Nitrite: x   Leuk Esterase: x / RBC: x / WBC x   Sq Epi: x / Non Sq Epi: x / Bacteria: x            MICROBIOLOGY:          Woody Estela | PGY2| Pager: [place] [phone]) -- [facility] ([phone])  Interval Events: NAEON; patient feels well, no seizure events overnight; CIWA 1-2 overnight    REVIEW OF SYSTEMS:  All other review of systems is negative unless indicated above.    OBJECTIVE:  ICU Vital Signs Last 24 Hrs  T(C): 36.4 (12 May 2024 02:00), Max: 37.1 (11 May 2024 10:00)  T(F): 97.6 (12 May 2024 02:00), Max: 98.7 (11 May 2024 10:00)  HR: 71 (12 May 2024 02:00) (66 - 75)  BP: 114/74 (12 May 2024 02:00) (112/78 - 118/82)  BP(mean): --  ABP: --  ABP(mean): --  RR: 16 (12 May 2024 02:00) (16 - 17)  SpO2: 98% (12 May 2024 02:00) (98% - 100%)    O2 Parameters below as of 12 May 2024 02:00  Patient On (Oxygen Delivery Method): room air              CAPILLARY BLOOD GLUCOSE          PHYSICAL EXAM:  General: WN/WD NAD  Neurology: A&Ox3, nonfocal, GÓMEZ x 4  Eyes: PERRLA  Respiratory: CTA B/L, No wheezing, rales, rhonchi  CV: RRR, +S1/S2, -S3/S4, no murmurs, rubs or gallops  Abdominal: Soft, NT, ND +BS, No HSM  MSK: 5/5 strength UE/LE bilaterally; Mild tremor detected on touch, but not visible  Extremities: No edema, 2+ peripheral pulses      HOSPITAL MEDICATIONS:  MEDICATIONS  (STANDING):  enoxaparin Injectable 40 milliGRAM(s) SubCutaneous every 24 hours  folic acid 1 milliGRAM(s) Oral daily  multivitamin 1 Tablet(s) Oral daily  nicotine -   7 mG/24Hr(s) Patch 1 Patch Transdermal daily  thiamine 100 milliGRAM(s) Oral daily    MEDICATIONS  (PRN):  acetaminophen     Tablet .. 650 milliGRAM(s) Oral every 6 hours PRN Temp greater or equal to 38C (100.4F), Mild Pain (1 - 3)  LORazepam     Tablet 2 milliGRAM(s) Oral every 2 hours PRN Symptom-triggered 2 point increase in CIWA-Ar  LORazepam     Tablet 2 milliGRAM(s) Oral every 1 hour PRN Symptom-triggered: each CIWA -Ar score 8 or GREATER      LABS:    Hgb Trend: 13.4<--  05-11    140  |  103  |  12  ----------------------------<  91  3.7   |  22  |  0.54    Ca    8.7      11 May 2024 05:58  Phos  4.2     05-11  Mg     2.00     05-11      Creatinine Trend: 0.54<--, 0.70<--    Urinalysis Basic - ( 11 May 2024 05:58 )    Color: x / Appearance: x / SG: x / pH: x  Gluc: 91 mg/dL / Ketone: x  / Bili: x / Urobili: x   Blood: x / Protein: x / Nitrite: x   Leuk Esterase: x / RBC: x / WBC x   Sq Epi: x / Non Sq Epi: x / Bacteria: x            MICROBIOLOGY:          Woody Estela | PGY2| Pager: [place] ([phone]) -- [facility] ([phone])  Interval Events: NAEON; patient feels well, no seizure events overnight; CIWA 1-2 overnight; stable for DC to home    REVIEW OF SYSTEMS:  All other review of systems is negative unless indicated above.    OBJECTIVE:  ICU Vital Signs Last 24 Hrs  T(C): 36.4 (12 May 2024 02:00), Max: 37.1 (11 May 2024 10:00)  T(F): 97.6 (12 May 2024 02:00), Max: 98.7 (11 May 2024 10:00)  HR: 71 (12 May 2024 02:00) (66 - 75)  BP: 114/74 (12 May 2024 02:00) (112/78 - 118/82)  BP(mean): --  ABP: --  ABP(mean): --  RR: 16 (12 May 2024 02:00) (16 - 17)  SpO2: 98% (12 May 2024 02:00) (98% - 100%)    O2 Parameters below as of 12 May 2024 02:00  Patient On (Oxygen Delivery Method): room air              CAPILLARY BLOOD GLUCOSE          PHYSICAL EXAM:  General: WN/WD NAD  Neurology: A&Ox3, nonfocal, GÓMEZ x 4  Eyes: PERRLA  Respiratory: CTA B/L, No wheezing, rales, rhonchi  CV: RRR, +S1/S2, -S3/S4, no murmurs, rubs or gallops  Abdominal: Soft, NT, ND +BS, No HSM  MSK: 5/5 strength UE/LE bilaterally; Mild tremor detected on touch, but not visible  Extremities: No edema, 2+ peripheral pulses      HOSPITAL MEDICATIONS:  MEDICATIONS  (STANDING):  enoxaparin Injectable 40 milliGRAM(s) SubCutaneous every 24 hours  folic acid 1 milliGRAM(s) Oral daily  multivitamin 1 Tablet(s) Oral daily  nicotine -   7 mG/24Hr(s) Patch 1 Patch Transdermal daily  thiamine 100 milliGRAM(s) Oral daily    MEDICATIONS  (PRN):  acetaminophen     Tablet .. 650 milliGRAM(s) Oral every 6 hours PRN Temp greater or equal to 38C (100.4F), Mild Pain (1 - 3)  LORazepam     Tablet 2 milliGRAM(s) Oral every 2 hours PRN Symptom-triggered 2 point increase in CIWA-Ar  LORazepam     Tablet 2 milliGRAM(s) Oral every 1 hour PRN Symptom-triggered: each CIWA -Ar score 8 or GREATER      LABS:    Hgb Trend: 13.4<--  05-11    140  |  103  |  12  ----------------------------<  91  3.7   |  22  |  0.54    Ca    8.7      11 May 2024 05:58  Phos  4.2     05-11  Mg     2.00     05-11      Creatinine Trend: 0.54<--, 0.70<--    Urinalysis Basic - ( 11 May 2024 05:58 )    Color: x / Appearance: x / SG: x / pH: x  Gluc: 91 mg/dL / Ketone: x  / Bili: x / Urobili: x   Blood: x / Protein: x / Nitrite: x   Leuk Esterase: x / RBC: x / WBC x   Sq Epi: x / Non Sq Epi: x / Bacteria: x            MICROBIOLOGY:

## 2024-05-12 NOTE — PROGRESS NOTE ADULT - ASSESSMENT
43yoM PMHx of EtOH dependence w/ hx of DT/seizures (recent 1/2024), anxiety, DVT (7/2023), p/w alcohol use c/f withdrawal 43M PMHx of EtOH dependence w/ hx of DT/seizures (recent 1/2024), anxiety, DVT (7/2023), p/w alcohol use c/f withdrawal

## 2024-05-12 NOTE — PROGRESS NOTE ADULT - PROBLEM SELECTOR PLAN 4
Diet: Regular  DVT: Lovenox iso prior DVT  Dispo: Home

## 2024-05-12 NOTE — PROGRESS NOTE ADULT - PROBLEM SELECTOR PLAN 2
Hx of LE DVT in the Right leg in 2023 and started on Eliquis  No longer taking Eliquis  L>R leg swelling  Hx of Baker cysts for which he takes Meloxicam    Plan  - DVT LE unremarkable  - Lovenox 40mg given hx of DVT

## 2024-05-12 NOTE — PROGRESS NOTE ADULT - ATTENDING COMMENTS
Patient examined at bedside. In brief, 43yoM PMHx of EtOH dependence, anxiety, DVT (7/2023), presenting with tremors and concern for  ETOh withdrawal.   - CIWA scores 1 - on standing ativan taper. Patient denies ETOh withdrawal seizures or ICU stay. Pt ambulating around the unit. Given low scores and no history of complicated withdrawal, will dc standing ativan taper and monitor on symptom triggered.   - Lower leg dopplers - negative     Dispo: home likely 5/12 pending CIWA     Time-based billing (NON-critical care).     50 minutes spent on total encounter. The necessity of the time spent during the encounter on this date of service was due to:     Review of laboratory data, radiology results, consultants' recommendations, documentation in [place], discussion with patient/house staff/ACP and interdisciplinary staff (such as , social workers, etc). Interventions were performed as documented above.
Patient examined at bedside. In brief, 43yoM PMHx of EtOH dependence, anxiety, DVT (7/2023), presenting with tremors and concern for  ETOh withdrawal.   - CIWA scores 1 - on standing ativan taper. Patient denies ETOh withdrawal seizures or ICU stay. Pt ambulating around the unit. Given low scores and no history of complicated withdrawal, will dc standing ativan taper and monitor on symptom triggered. Patient did not require any ativan on symptom triggered medically stable for d/c   - Lower leg dopplers - negative     Dispo: home  5/12 35 minutes spent discharge planning.
Patient examined at bedside. In brief, 43yoM PMHx of EtOH dependence w/ hx of DT/seizures (recent 1/2024), anxiety, DVT (7/2023), presenting with tremors and concern for  ETOh withdrawal.   - CIWA scores 4 - on standing ativan taper   - Lower leg dopplers - negative     Dispo: home likely 5/12

## 2024-05-12 NOTE — DISCHARGE NOTE NURSING/CASE MANAGEMENT/SOCIAL WORK - PATIENT PORTAL LINK FT
You can access the FollowMyHealth Patient Portal offered by [facility] by registering at the following website: [email or web address]. By joining [facility]’s FollowMyHealth portal, you will also be able to view your health information using other applications (apps) compatible with our system.

## 2024-08-28 PROBLEM — N62 GYNECOMASTIA, MALE: Status: ACTIVE | Noted: 2024-08-28

## 2024-09-03 ENCOUNTER — NON-APPOINTMENT (OUTPATIENT)
Age: 44
End: 2024-09-03

## 2024-09-04 ENCOUNTER — APPOINTMENT (OUTPATIENT)
Dept: SURGICAL ONCOLOGY | Facility: CLINIC | Age: 44
End: 2024-09-04
Payer: COMMERCIAL

## 2024-09-04 ENCOUNTER — NON-APPOINTMENT (OUTPATIENT)
Age: 44
End: 2024-09-04

## 2024-09-04 VITALS
SYSTOLIC BLOOD PRESSURE: 136 MMHG | DIASTOLIC BLOOD PRESSURE: 71 MMHG | RESPIRATION RATE: 16 BRPM | HEART RATE: 63 BPM | WEIGHT: 156 LBS | HEIGHT: 69 IN | BODY MASS INDEX: 23.11 KG/M2 | OXYGEN SATURATION: 97 %

## 2024-09-04 DIAGNOSIS — N62 HYPERTROPHY OF BREAST: ICD-10-CM

## 2024-09-04 DIAGNOSIS — R59.9 ENLARGED LYMPH NODES, UNSPECIFIED: ICD-10-CM

## 2024-09-04 DIAGNOSIS — Z78.9 OTHER SPECIFIED HEALTH STATUS: ICD-10-CM

## 2024-09-04 PROCEDURE — 99204 OFFICE O/P NEW MOD 45 MIN: CPT

## 2024-09-04 RX ORDER — LORAZEPAM 0.5 MG/1
0.5 TABLET ORAL
Refills: 0 | Status: ACTIVE | COMMUNITY

## 2024-09-04 NOTE — PHYSICAL EXAM
[Normocephalic] : normocephalic [Atraumatic] : atraumatic [EOMI] : extra ocular movement intact [PERRL] : pupils equal, round and reactive to light [Sclera nonicteric] : sclera nonicteric [Supple] : supple [No Supraclavicular Adenopathy] : no supraclavicular adenopathy [Examined in the supine and seated position] : examined in the supine and seated position [Symmetrical] : symmetrical [None] : no ptosis [No dominant masses] : no dominant masses in right breast  [No dominant masses] : no dominant masses left breast [No Nipple Retraction] : no left nipple retraction [No Nipple Discharge] : no left nipple discharge [Breast Mass Left Breast ___cm] : no masses [Breast Nipple Inversion] : nipples not inverted [Breast Nipple Retraction] : nipples not retracted [Breast Nipple Flattening] : nipples not flattened [Breast Nipple Fissures] : nipples not fissured [No Axillary Lymphadenopathy] : no left axillary lymphadenopathy [No Edema] : no edema [No Rashes] : no rashes [No Ulceration] : no ulceration [de-identified] : non-labored respirations  [de-identified] : 3 cm rubbery mass behind nipple, no skin changes [de-identified] : slight contour defect behind nipple, well-healed periareolar incision [de-identified] : enlarged axillary LN, slight nodularity

## 2024-09-04 NOTE — ASSESSMENT
[FreeTextEntry1] : Mr. MARIAM WONG is a 44-year-old man, referred by Dr. Georgia Pinto for consultation regarding a L breast mass, here for an initial visit.   Recent Imaging, BR2.  We discussed that decreasing free testosterone levels may contribute to gynecomastia in men older than 50 years (physiologic). Other reasons for gynecomastia that are non-physiologic include liver disease, thyroid disorders, hypogonadism, renal failure, and medication side effects. Pain is more common in patients with gynecomastia that is rapidly progressive or of recent onset. For patients with nonphysiologic gynecomastia, treatment is directed toward improving the underlying illness or discontinuing use of the contributing.   Watchful waiting with biannual follow-up is appropriate for those with physiologic gynecomastia who are untroubled by symptoms and who have no features that suggest underlying disease or malignancy.  Exam today shows small gynecomastia, also with palpable Right axillary lymph node. Will further evaluate with targeted US.   PLAN:  - R axillary US  - RTO PRN if US wnl

## 2024-09-04 NOTE — CONSULT LETTER
[Dear  ___] : Dear  [unfilled], [Consult Letter:] : I had the pleasure of evaluating your patient, [unfilled]. [Please see my note below.] : Please see my note below. [Consult Closing:] : Thank you very much for allowing me to participate in the care of this patient.  If you have any questions, please do not hesitate to contact me. [Sincerely,] : Sincerely, [FreeTextEntry2] : Georgia Pinto MD [FreeTextEntry3] : Ayanna Baumann MD Breast Surgeon Division of Surgical Oncology Department of Surgery [address] [place] [address] Tel: [phone] Fax: [phone] Email: [email or web address]

## 2024-09-04 NOTE — PHYSICAL EXAM
[Normocephalic] : normocephalic [Atraumatic] : atraumatic [EOMI] : extra ocular movement intact [PERRL] : pupils equal, round and reactive to light [Sclera nonicteric] : sclera nonicteric [Supple] : supple [No Supraclavicular Adenopathy] : no supraclavicular adenopathy [Examined in the supine and seated position] : examined in the supine and seated position [Symmetrical] : symmetrical [None] : no ptosis [No dominant masses] : no dominant masses in right breast  [No dominant masses] : no dominant masses left breast [No Nipple Retraction] : no left nipple retraction [No Nipple Discharge] : no left nipple discharge [Breast Mass Left Breast ___cm] : no masses [Breast Nipple Inversion] : nipples not inverted [Breast Nipple Retraction] : nipples not retracted [Breast Nipple Flattening] : nipples not flattened [Breast Nipple Fissures] : nipples not fissured [No Axillary Lymphadenopathy] : no left axillary lymphadenopathy [No Edema] : no edema [No Rashes] : no rashes [No Ulceration] : no ulceration [de-identified] : non-labored respirations  [de-identified] : 3 cm rubbery mass behind nipple, no skin changes [de-identified] : slight contour defect behind nipple, well-healed periareolar incision [de-identified] : enlarged axillary LN, slight nodularity

## 2024-09-04 NOTE — CONSULT LETTER
[Dear  ___] : Dear  [unfilled], [Consult Letter:] : I had the pleasure of evaluating your patient, [unfilled]. [Please see my note below.] : Please see my note below. [Consult Closing:] : Thank you very much for allowing me to participate in the care of this patient.  If you have any questions, please do not hesitate to contact me. [Sincerely,] : Sincerely, [FreeTextEntry2] : Georgia Pinto MD [FreeTextEntry3] : Ayanna Baumann MD Breast Surgeon Division of Surgical Oncology Department of Surgery [address] [place] [address] Tel: [phone] Fax: [phone] Email: [email or web address]

## 2024-09-04 NOTE — HISTORY OF PRESENT ILLNESS
[FreeTextEntry1] : Mr. MARIAM WONG is a 44-year-old man, referred by Dr. Georgia Pinto for consultation regarding a L breast mass, here for an initial visit.   The patient reports history of Left gynecomastia in  s/p excision. He reports that over the past few years, he has been drinking heavily and he noticed that his right breast was increasing in size. In , he stopped drinking, and since then, the breast swelling has decreased significantly, but there remains a small mass still behind the nipple. The mass is about "cherry" sized." He also notes some discharge with squeezing, clear, non-bloody.   Recent Imagin2024 B/L DM ([facility]) revealed SFGD - L central breast postsurgical scarring - R central breast, extending from the RA region is a 3.5 cm well-circumscribed area of isodense nodularity w/ areas of fat density running through them the appearance of which is this clinical setting is most c/w nodular florid gynecomastia on the R   2024 B/L US ([facility]) - L neg - R RA, 3.5 x 0.5 cm area of compressible lacy hypoechoic change c/w gynecomastia  - BR2   PMH:  anxiety PSH:  L breast gynecomastia . R biceps surgery 2017. B/L leg surgery Meds:  Lorazepam 2-3x /week ALL:  Denies SH:  No tobacco. No EtOH. Previously EtOH abuse FH: No breast cancer. No other cancers  in 1st or 2nd degree relatives

## 2024-09-11 ENCOUNTER — APPOINTMENT (OUTPATIENT)
Dept: ULTRASOUND IMAGING | Facility: CLINIC | Age: 44
End: 2024-09-11

## 2024-09-11 PROCEDURE — 76882 US LMTD JT/FCL EVL NVASC XTR: CPT | Mod: RT

## 2024-09-16 ENCOUNTER — EMERGENCY (EMERGENCY)
Facility: HOSPITAL | Age: 44
LOS: 1 days | Discharge: HOME | End: 2024-09-16
Attending: STUDENT IN AN ORGANIZED HEALTH CARE EDUCATION/TRAINING PROGRAM | Admitting: STUDENT IN AN ORGANIZED HEALTH CARE EDUCATION/TRAINING PROGRAM
Payer: COMMERCIAL

## 2024-09-16 VITALS
DIASTOLIC BLOOD PRESSURE: 78 MMHG | SYSTOLIC BLOOD PRESSURE: 134 MMHG | TEMPERATURE: 98 F | RESPIRATION RATE: 16 BRPM | WEIGHT: 160.06 LBS | HEART RATE: 79 BPM | OXYGEN SATURATION: 98 %

## 2024-09-16 VITALS
RESPIRATION RATE: 15 BRPM | TEMPERATURE: 98 F | SYSTOLIC BLOOD PRESSURE: 115 MMHG | HEART RATE: 79 BPM | DIASTOLIC BLOOD PRESSURE: 64 MMHG | OXYGEN SATURATION: 98 %

## 2024-09-16 DIAGNOSIS — Z98.890 OTHER SPECIFIED POSTPROCEDURAL STATES: Chronic | ICD-10-CM

## 2024-09-16 DIAGNOSIS — S46.219A STRAIN OF MUSCLE, FASCIA AND TENDON OF OTHER PARTS OF BICEPS, UNSPECIFIED ARM, INITIAL ENCOUNTER: Chronic | ICD-10-CM

## 2024-09-16 PROCEDURE — 93971 EXTREMITY STUDY: CPT | Mod: 26,LT

## 2024-09-16 PROCEDURE — 99284 EMERGENCY DEPT VISIT MOD MDM: CPT

## 2024-09-16 RX ORDER — CHLORDIAZEPOXIDE HCL 5 MG
50 CAPSULE ORAL ONCE
Refills: 0 | Status: DISCONTINUED | OUTPATIENT
Start: 2024-09-16 | End: 2024-09-16

## 2024-09-16 RX ADMIN — Medication 50 MILLIGRAM(S): at 23:28

## 2024-09-16 NOTE — ED ADULT TRIAGE NOTE - CHIEF COMPLAINT QUOTE
Pt presents for alcohol intoxication, requesting detox, last drink was this afternoon, no history of seizures related to alcohol consumption, denies abdominal pain, no n/v/d, afebrile. Accompanied with father.

## 2024-09-16 NOTE — ED PROVIDER NOTE - OBJECTIVE STATEMENT
43 y/o M h/o etoh abuse, h/o withdrawal seizures, DTs, DVT no longer on AC presents with withdrawal. last drink this afternoon. usually drinks liter of whisky daily. Pt reports mild tremors, chills, headache. No fever, abd pain, n/v/d, dizziness. 45 y/o M h/o etoh abuse, h/o withdrawal seizures, DTs, DVT no longer on AC presents with withdrawal. last drink this afternoon. usually drinks liter of whisky daily. Pt states last drink was this afternoon, unsure of exact time. Pt reports chills, headache. No tremors. No fever, abd pain, n/v/d, dizziness.  Pt also reporting RLE edema, which is new. states this is related to his alcohol abuse, however did have DVT in that leg after foot surgery. was discontinued on AC last year,

## 2024-09-16 NOTE — ED PROVIDER NOTE - CLINICAL SUMMARY MEDICAL DECISION MAKING FREE TEXT BOX
43 y/o M h/o etoh abuse, h/o withdrawal seizures, DTs, DVT no longer on AC presents with withdrawal. last drink this afternoon. usually drinks liter of whisky daily. Pt reports mild tremors, chills, headache. No fever, abd pain, n/v/d, dizziness.    CIWA score =     plan  - labs  - ativan  - ekg 43 y/o M h/o etoh abuse, h/o withdrawal seizures, DTs, DVT no longer on AC presents with withdrawal. last drink this afternoon. usually drinks liter of whisky daily. Pt reports mild tremors, chills, headache. No fever, abd pain, n/v/d, dizziness.    CIWA score = 6    plan  - LE doppler   - ekg

## 2024-09-16 NOTE — ED ADULT NURSE NOTE - OBJECTIVE STATEMENT
Pt is A&O x 4, ambulatory w/o assistance, shows no signs of acute distress. Presents to the ED requesting detox for alcohol withdrawal. Pt states he drinks a liter of whiskey a day, last drink this afternoon. Upon arrival to [facility] pt now requesting outpatient services. Also endorsing chills, h/a, RLE pain and edema. Hx of DVT, no longer on AC.

## 2024-09-16 NOTE — ED PROVIDER NOTE - PATIENT PORTAL LINK FT
You can access the FollowMyHealth Patient Portal offered by [facility] by registering at the following website: [email or web address]. By joining [facility]’s FollowMyHealth portal, you will also be able to view your health information using other applications (apps) compatible with our system.

## 2024-09-16 NOTE — ED PROVIDER NOTE - PROGRESS NOTE DETAILS
labs unremarkable. US neg for DVT. Pt plans to present himself to inpatient rehab tomorrow. CIWA=6. No tremors at current. Well appearing. Will give dose of librium here and d/c for patient to go to rehab tomorrow. Pt girlfriend bedside agrees with plan and will be bringing patient home.

## 2024-10-30 ENCOUNTER — OUTPATIENT (OUTPATIENT)
Dept: OUTPATIENT SERVICES | Facility: HOSPITAL | Age: 44
LOS: 1 days | Discharge: HOME | End: 2024-10-30
Payer: COMMERCIAL

## 2024-10-30 DIAGNOSIS — S46.219A STRAIN OF MUSCLE, FASCIA AND TENDON OF OTHER PARTS OF BICEPS, UNSPECIFIED ARM, INITIAL ENCOUNTER: Chronic | ICD-10-CM

## 2024-10-30 DIAGNOSIS — Z98.890 OTHER SPECIFIED POSTPROCEDURAL STATES: Chronic | ICD-10-CM

## 2024-10-31 DIAGNOSIS — F10.20 ALCOHOL DEPENDENCE, UNCOMPLICATED: ICD-10-CM

## 2024-10-31 DIAGNOSIS — F41.9 ANXIETY DISORDER, UNSPECIFIED: ICD-10-CM

## 2024-11-06 PROCEDURE — 90834 PSYTX W PT 45 MINUTES: CPT

## 2024-11-13 PROCEDURE — 90834 PSYTX W PT 45 MINUTES: CPT

## 2024-11-27 PROCEDURE — 90834 PSYTX W PT 45 MINUTES: CPT

## 2024-12-13 ENCOUNTER — OUTPATIENT (OUTPATIENT)
Dept: OUTPATIENT SERVICES | Facility: HOSPITAL | Age: 44
LOS: 1 days | End: 2024-12-13
Payer: COMMERCIAL

## 2024-12-13 ENCOUNTER — APPOINTMENT (OUTPATIENT)
Dept: ULTRASOUND IMAGING | Facility: IMAGING CENTER | Age: 44
End: 2024-12-13
Payer: COMMERCIAL

## 2024-12-13 DIAGNOSIS — R59.9 ENLARGED LYMPH NODES, UNSPECIFIED: ICD-10-CM

## 2024-12-13 DIAGNOSIS — Z98.890 OTHER SPECIFIED POSTPROCEDURAL STATES: Chronic | ICD-10-CM

## 2024-12-13 DIAGNOSIS — S46.219A STRAIN OF MUSCLE, FASCIA AND TENDON OF OTHER PARTS OF BICEPS, UNSPECIFIED ARM, INITIAL ENCOUNTER: Chronic | ICD-10-CM

## 2024-12-13 PROCEDURE — 76882 US LMTD JT/FCL EVL NVASC XTR: CPT

## 2024-12-13 PROCEDURE — 76882 US LMTD JT/FCL EVL NVASC XTR: CPT | Mod: 26,RT

## 2024-12-13 PROCEDURE — 90834 PSYTX W PT 45 MINUTES: CPT

## 2025-03-07 PROCEDURE — 90837 PSYTX W PT 60 MINUTES: CPT

## 2025-04-23 PROCEDURE — 90834 PSYTX W PT 45 MINUTES: CPT

## 2025-09-04 ENCOUNTER — APPOINTMENT (OUTPATIENT)
Dept: ULTRASOUND IMAGING | Facility: CLINIC | Age: 45
End: 2025-09-04
Payer: COMMERCIAL

## 2025-09-04 ENCOUNTER — OUTPATIENT (OUTPATIENT)
Dept: OUTPATIENT SERVICES | Facility: HOSPITAL | Age: 45
LOS: 1 days | End: 2025-09-04
Payer: COMMERCIAL

## 2025-09-04 DIAGNOSIS — Z98.890 OTHER SPECIFIED POSTPROCEDURAL STATES: Chronic | ICD-10-CM

## 2025-09-04 DIAGNOSIS — S46.219A STRAIN OF MUSCLE, FASCIA AND TENDON OF OTHER PARTS OF BICEPS, UNSPECIFIED ARM, INITIAL ENCOUNTER: Chronic | ICD-10-CM

## 2025-09-04 DIAGNOSIS — Z00.8 ENCOUNTER FOR OTHER GENERAL EXAMINATION: ICD-10-CM

## 2025-09-04 DIAGNOSIS — N50.89 OTHER SPECIFIED DISORDERS OF THE MALE GENITAL ORGANS: ICD-10-CM

## 2025-09-04 PROCEDURE — 76870 US EXAM SCROTUM: CPT

## 2025-09-04 PROCEDURE — 76870 US EXAM SCROTUM: CPT | Mod: 26

## (undated) DEVICE — SUT ETHILON 5-0 18" P-3

## (undated) DEVICE — DRAPE C ARM MINI PACK FOR 6800

## (undated) DEVICE — DRSG ADAPTIC 3 X 3"

## (undated) DEVICE — DRSG STERISTRIPS 0.25 X 3"

## (undated) DEVICE — WARMING BLANKET UPPER ADULT

## (undated) DEVICE — DRSG KLING 3"

## (undated) DEVICE — Device

## (undated) DEVICE — PACK ORTHO FOOT ANKLE

## (undated) DEVICE — DRILL BIT NOVASTEP CANN NEXIS 17.5MM

## (undated) DEVICE — BEAVER BLADE MIN-BLADE ROUNDED TIP 1-SIDE SHARP (GREEN)

## (undated) DEVICE — POSITIONER FOAM EGG CRATE ULNAR 2PCS (PINK)

## (undated) DEVICE — DRSG STOCKINETTE UNDERCAST SYNTHETIC 4"

## (undated) DEVICE — DRSG WEBRIL 4"

## (undated) DEVICE — SUT MONOCRYL 4-0 18" PS-2

## (undated) DEVICE — SLV COMPRESSION KNEE MED

## (undated) DEVICE — TOURNIQUET CUFF 18" DUAL PORT SINGLE BLADDER W PLC  (BLACK)

## (undated) DEVICE — GLV 7 PROTEXIS (WHITE)

## (undated) DEVICE — DRILL BIT NOVASTEP ARCAD 20MM

## (undated) DEVICE — SAW BLADE LINVATEC SAGITTAL MIC 9.5X25.5X0.4MM

## (undated) DEVICE — BUR BRASSELER OVAL 4 X 8MM